# Patient Record
Sex: MALE | Race: BLACK OR AFRICAN AMERICAN | Employment: UNEMPLOYED | URBAN - METROPOLITAN AREA
[De-identification: names, ages, dates, MRNs, and addresses within clinical notes are randomized per-mention and may not be internally consistent; named-entity substitution may affect disease eponyms.]

---

## 2017-04-18 ENCOUNTER — APPOINTMENT (OUTPATIENT)
Dept: GENERAL RADIOLOGY | Age: 67
End: 2017-04-18
Attending: EMERGENCY MEDICINE
Payer: OTHER GOVERNMENT

## 2017-04-18 ENCOUNTER — HOSPITAL ENCOUNTER (EMERGENCY)
Age: 67
Discharge: HOME | End: 2017-04-19
Attending: EMERGENCY MEDICINE
Payer: OTHER GOVERNMENT

## 2017-04-18 DIAGNOSIS — R07.89 OTHER CHEST PAIN: Primary | ICD-10-CM

## 2017-04-18 LAB
ALBUMIN SERPL BCP-MCNC: 3.9 G/DL (ref 3.5–5)
ALBUMIN/GLOB SERPL: 1.1 {RATIO} (ref 1.1–2.2)
ALP SERPL-CCNC: 85 U/L (ref 45–117)
ALT SERPL-CCNC: 19 U/L (ref 12–78)
ANION GAP BLD CALC-SCNC: 10 MMOL/L (ref 5–15)
APPEARANCE UR: ABNORMAL
AST SERPL W P-5'-P-CCNC: 14 U/L (ref 15–37)
BACTERIA URNS QL MICRO: NEGATIVE /HPF
BASOPHILS # BLD AUTO: 0 K/UL (ref 0–0.1)
BASOPHILS # BLD: 0 % (ref 0–1)
BILIRUB SERPL-MCNC: 0.4 MG/DL (ref 0.2–1)
BILIRUB UR QL: NEGATIVE
BUN SERPL-MCNC: 23 MG/DL (ref 6–20)
BUN/CREAT SERPL: 20 (ref 12–20)
CALCIUM SERPL-MCNC: 9 MG/DL (ref 8.5–10.1)
CHLORIDE SERPL-SCNC: 104 MMOL/L (ref 97–108)
CK SERPL-CCNC: 148 U/L (ref 39–308)
CO2 SERPL-SCNC: 25 MMOL/L (ref 21–32)
COLOR UR: ABNORMAL
CREAT SERPL-MCNC: 1.17 MG/DL (ref 0.7–1.3)
EOSINOPHIL # BLD: 0.2 K/UL (ref 0–0.4)
EOSINOPHIL NFR BLD: 2 % (ref 0–7)
EPITH CASTS URNS QL MICRO: ABNORMAL /LPF
ERYTHROCYTE [DISTWIDTH] IN BLOOD BY AUTOMATED COUNT: 13.7 % (ref 11.5–14.5)
GLOBULIN SER CALC-MCNC: 3.5 G/DL (ref 2–4)
GLUCOSE SERPL-MCNC: 128 MG/DL (ref 65–100)
GLUCOSE UR STRIP.AUTO-MCNC: NEGATIVE MG/DL
HCT VFR BLD AUTO: 36.1 % (ref 36.6–50.3)
HGB BLD-MCNC: 13 G/DL (ref 12.1–17)
HGB UR QL STRIP: NEGATIVE
HYALINE CASTS URNS QL MICRO: ABNORMAL /LPF (ref 0–5)
KETONES UR QL STRIP.AUTO: NEGATIVE MG/DL
LEUKOCYTE ESTERASE UR QL STRIP.AUTO: ABNORMAL
LYMPHOCYTES # BLD AUTO: 48 % (ref 12–49)
LYMPHOCYTES # BLD: 3.4 K/UL (ref 0.8–3.5)
MCH RBC QN AUTO: 32.3 PG (ref 26–34)
MCHC RBC AUTO-ENTMCNC: 36 G/DL (ref 30–36.5)
MCV RBC AUTO: 89.6 FL (ref 80–99)
MONOCYTES # BLD: 0.5 K/UL (ref 0–1)
MONOCYTES NFR BLD AUTO: 7 % (ref 5–13)
NEUTS SEG # BLD: 3.1 K/UL (ref 1.8–8)
NEUTS SEG NFR BLD AUTO: 43 % (ref 32–75)
NITRITE UR QL STRIP.AUTO: NEGATIVE
PH UR STRIP: 5.5 [PH] (ref 5–8)
PLATELET # BLD AUTO: 266 K/UL (ref 150–400)
POTASSIUM SERPL-SCNC: 3.4 MMOL/L (ref 3.5–5.1)
PROT SERPL-MCNC: 7.4 G/DL (ref 6.4–8.2)
PROT UR STRIP-MCNC: ABNORMAL MG/DL
RBC # BLD AUTO: 4.03 M/UL (ref 4.1–5.7)
RBC #/AREA URNS HPF: ABNORMAL /HPF (ref 0–5)
SODIUM SERPL-SCNC: 139 MMOL/L (ref 136–145)
SP GR UR REFRACTOMETRY: 1.02 (ref 1–1.03)
TROPONIN I SERPL-MCNC: <0.04 NG/ML
UA: UC IF INDICATED,UAUC: ABNORMAL
UROBILINOGEN UR QL STRIP.AUTO: 1 EU/DL (ref 0.2–1)
WBC # BLD AUTO: 7.2 K/UL (ref 4.1–11.1)
WBC URNS QL MICRO: ABNORMAL /HPF (ref 0–4)

## 2017-04-18 PROCEDURE — 36415 COLL VENOUS BLD VENIPUNCTURE: CPT | Performed by: EMERGENCY MEDICINE

## 2017-04-18 PROCEDURE — 99285 EMERGENCY DEPT VISIT HI MDM: CPT

## 2017-04-18 PROCEDURE — 80053 COMPREHEN METABOLIC PANEL: CPT | Performed by: EMERGENCY MEDICINE

## 2017-04-18 PROCEDURE — 82550 ASSAY OF CK (CPK): CPT | Performed by: EMERGENCY MEDICINE

## 2017-04-18 PROCEDURE — 93005 ELECTROCARDIOGRAM TRACING: CPT

## 2017-04-18 PROCEDURE — 81001 URINALYSIS AUTO W/SCOPE: CPT | Performed by: EMERGENCY MEDICINE

## 2017-04-18 PROCEDURE — 84484 ASSAY OF TROPONIN QUANT: CPT | Performed by: EMERGENCY MEDICINE

## 2017-04-18 PROCEDURE — 85025 COMPLETE CBC W/AUTO DIFF WBC: CPT | Performed by: EMERGENCY MEDICINE

## 2017-04-18 PROCEDURE — 71010 XR CHEST PORT: CPT

## 2017-04-19 VITALS
RESPIRATION RATE: 16 BRPM | OXYGEN SATURATION: 99 % | TEMPERATURE: 98.1 F | SYSTOLIC BLOOD PRESSURE: 121 MMHG | BODY MASS INDEX: 24.25 KG/M2 | HEART RATE: 62 BPM | DIASTOLIC BLOOD PRESSURE: 74 MMHG | WEIGHT: 160 LBS | HEIGHT: 68 IN

## 2017-04-19 LAB
ATRIAL RATE: 64 BPM
CALCULATED P AXIS, ECG09: 60 DEGREES
CALCULATED R AXIS, ECG10: -86 DEGREES
CALCULATED T AXIS, ECG11: 41 DEGREES
CK SERPL-CCNC: 141 U/L (ref 39–308)
DIAGNOSIS, 93000: NORMAL
P-R INTERVAL, ECG05: 218 MS
Q-T INTERVAL, ECG07: 434 MS
QRS DURATION, ECG06: 160 MS
QTC CALCULATION (BEZET), ECG08: 447 MS
TROPONIN I SERPL-MCNC: <0.04 NG/ML
VENTRICULAR RATE, ECG03: 64 BPM

## 2017-04-19 PROCEDURE — 82550 ASSAY OF CK (CPK): CPT | Performed by: EMERGENCY MEDICINE

## 2017-04-19 PROCEDURE — 84484 ASSAY OF TROPONIN QUANT: CPT | Performed by: EMERGENCY MEDICINE

## 2017-04-19 NOTE — ED NOTES
Bedside shift change report given to oncoming RN (oncoming nurse) by Omar Ruiz (offgoing nurse). Report included the following information SBAR and ED Summary.

## 2017-04-19 NOTE — DISCHARGE INSTRUCTIONS
Chest Pain: Care Instructions  Your Care Instructions  There are many things that can cause chest pain. Some are not serious and will get better on their own in a few days. But some kinds of chest pain need more testing and treatment. Your doctor may have recommended a follow-up visit in the next 8 to 12 hours. If you are not getting better, you may need more tests or treatment. Even though your doctor has released you, you still need to watch for any problems. The doctor carefully checked you, but sometimes problems can develop later. If you have new symptoms or if your symptoms do not get better, get medical care right away. If you have worse or different chest pain or pressure that lasts more than 5 minutes or you passed out (lost consciousness), call 911 or seek other emergency help right away. A medical visit is only one step in your treatment. Even if you feel better, you still need to do what your doctor recommends, such as going to all suggested follow-up appointments and taking medicines exactly as directed. This will help you recover and help prevent future problems. How can you care for yourself at home? · Rest until you feel better. · Take your medicine exactly as prescribed. Call your doctor if you think you are having a problem with your medicine. · Do not drive after taking a prescription pain medicine. When should you call for help? Call 911 if:  · You passed out (lost consciousness). · You have severe difficulty breathing. · You have symptoms of a heart attack. These may include:  ¨ Chest pain or pressure, or a strange feeling in your chest.  ¨ Sweating. ¨ Shortness of breath. ¨ Nausea or vomiting. ¨ Pain, pressure, or a strange feeling in your back, neck, jaw, or upper belly or in one or both shoulders or arms. ¨ Lightheadedness or sudden weakness. ¨ A fast or irregular heartbeat.   After you call 911, the  may tell you to chew 1 adult-strength or 2 to 4 low-dose aspirin. Wait for an ambulance. Do not try to drive yourself. Call your doctor today if:  · You have any trouble breathing. · Your chest pain gets worse. · You are dizzy or lightheaded, or you feel like you may faint. · You are not getting better as expected. · You are having new or different chest pain. Where can you learn more? Go to http://tania-lorna.info/. Enter A120 in the search box to learn more about \"Chest Pain: Care Instructions. \"  Current as of: May 27, 2016  Content Version: 11.2  © 6147-5695 [employer]. Care instructions adapted under license by [employer] (which disclaims liability or warranty for this information). If you have questions about a medical condition or this instruction, always ask your healthcare professional. [address] any warranty or liability for your use of this information.

## 2017-04-19 NOTE — ED NOTES
Patient moved to room #12 to await cardiology to see him this AM. Patient notes pain has eased greatly

## 2017-04-19 NOTE — ED NOTES
Care assumed of patient @ this time & intro with rounding done, with report from [facility], RN_________________. Patient resting quietly on stretcher without verbal complaints.

## 2017-04-19 NOTE — PROGRESS NOTES
Patient requested and provided [employer] voucher. He did not have anyone else that could provide transportation. He states he does have access to enter his home.   Cab ETA approximately 7:15-7:30AM.    Nicole Baxter RN, BSN, [facility]  ED Case Manager  [phone]

## 2017-04-19 NOTE — ED NOTES
Dr. Martha Goncalves at bedside to provide discharge paperwork. Vital signs stable. Pt in no apparent distress at this time. Mental status at baseline. Ambulatory to waiting room with steady gate, discharge paperwork in hand. Pt was given a cab ticket and was instructed to wait in the waiting room for his cab ride.

## 2017-04-19 NOTE — ED PROVIDER NOTES
HPI Comments: Marlne Dick is a 74 y.o. male with PMHx significant for HTN, acute CVA, and old MI who presents via EMS to [facility] ED with cc of acute onset intermittent CP since 11 am this morning. Pt reports his episodes last between 2-10 minutes and he is currently experiencing an episode of CP during initial encounter. Pt does states he has a hx of CP and notes \"I had a couple confirmed MI by my doctor and didn't even know it\". Pt's last cardiac stress test and catheterization was about a year ago. Pt notes that he does have SOB on exertion at baseline. He is on daily 81 mg ASA. Pt specifically denies any N/V/D, abdominal pain. [facility] ([facility] AT [place]: Dr. Galdino Arizmendi Hx: + tobacco (1 ppd), +EtOH (occasionally), + illicit drugs. There are no other complaints, changes or physical findings at this time. The history is provided by the patient and the EMS personnel. No  was used. Past Medical History:   Diagnosis Date    Acute CVA (cerebrovascular accident) ([address]     Diverticulitis large intestine w/o perforation or abscess w/bleeding 2006    with surgery and sepsis    Hypertension     Old myocardial infarct     Tobacco use        Past Surgical History:   Procedure Laterality Date    HX GI      colectomy         Family History:   Problem Relation Age of Onset    Heart Attack Brother     Heart Disease Brother        Social History     Social History    Marital status: SINGLE     Spouse name: N/A    Number of children: N/A    Years of education: N/A     Occupational History    Not on file. Social History Main Topics    Smoking status: Current Every Day Smoker     Packs/day: 1.00    Smokeless tobacco: Never Used    Alcohol use Yes      Comment: occasional    Drug use: Yes    Sexual activity: Not on file     Other Topics Concern    Not on file     Social History Narrative         ALLERGIES: Review of patient's allergies indicates no known allergies.     Review of Systems   Constitutional: Negative for chills and fever. HENT: Negative. Negative for congestion, rhinorrhea, sneezing and sore throat. Eyes: Negative. Negative for redness and visual disturbance. Respiratory: Positive for shortness of breath (at baseline). Negative for cough and wheezing. Cardiovascular: Positive for chest pain. Negative for leg swelling. Gastrointestinal: Negative. Negative for abdominal pain, diarrhea, nausea and vomiting. Genitourinary: Negative. Negative for difficulty urinating, discharge and frequency. Musculoskeletal: Negative. Negative for arthralgias, back pain, myalgias and neck stiffness. Skin: Negative. Negative for color change and rash. Neurological: Negative. Negative for dizziness, syncope, weakness, numbness and headaches. Hematological: Negative for adenopathy. Psychiatric/Behavioral: Negative. All other systems reviewed and are negative. Vitals:    04/19/17 0001 04/19/17 0030 04/19/17 0115 04/19/17 0145   BP: 150/78 128/77 113/50 124/73   Pulse: 62 61 (!) 57 64   Resp: 16 13 13 19   Temp:       SpO2: 94% 98% 93% 97%   Weight:       Height:                Physical Exam   Constitutional: He is oriented to person, place, and time. HENT:   Head: Atraumatic. Eyes: EOM are normal.   Cardiovascular: Normal rate, regular rhythm, normal heart sounds and intact distal pulses. Exam reveals no gallop and no friction rub. No murmur heard. Pulmonary/Chest: Effort normal and breath sounds normal. No respiratory distress. He has no wheezes. He has no rales. He exhibits no tenderness. Abdominal: Soft. Bowel sounds are normal. He exhibits no distension and no mass. There is no tenderness. There is no rebound and no guarding. Well healed large vertical keloid abdominal incision   Musculoskeletal: Normal range of motion. He exhibits no edema or tenderness. Neurological: He is alert and oriented to person, place, and time.    Psychiatric: He has a normal mood and affect. Nursing note and vitals reviewed. MDM  Number of Diagnoses or Management Options  Diagnosis management comments: DDx: ACS, Acute MI need to be excluded with 2 set cardiac rule out. Amount and/or Complexity of Data Reviewed  Clinical lab tests: ordered and reviewed  Tests in the radiology section of CPT®: ordered and reviewed  Tests in the medicine section of CPT®: ordered and reviewed  Review and summarize past medical records: yes  Independent visualization of images, tracings, or specimens: yes    Patient Progress  Patient progress: stable    ED Course       Procedures    Progress Note-  3:23 AM  Pt endorses having chest pain still so will hold him for cardiology in the morning. Written by Reza Rios ED scribe as dictated by Braydon Rogel MD.    CONSULT NOTE:   6:23 AM  Braydon Rogel MD spoke with Dr. Khalif Alejandra,   Specialty: Cardiology  Discussed pt's hx, disposition, and available diagnostic and imaging results. Reviewed care plans. Consultant agrees with plans as outlined. He recommends giving the pt a stress test. Will ask pt if he wants that done here or at the [place].    Written by Reza Rios ED Scribe, as dictated by Braydon Rogel MD.    LABORATORY TESTS:  Recent Results (from the past 12 hour(s))   EKG, 12 LEAD, INITIAL    Collection Time: 04/18/17  9:46 PM   Result Value Ref Range    Ventricular Rate 64 BPM    Atrial Rate 64 BPM    P-R Interval 218 ms    QRS Duration 160 ms    Q-T Interval 434 ms    QTC Calculation (Bezet) 447 ms    Calculated P Axis 60 degrees    Calculated R Axis -86 degrees    Calculated T Axis 41 degrees    Diagnosis       Sinus rhythm with 1st degree AV block  Right bundle branch block  Left anterior fascicular block  ** Bifascicular block **  Cannot rule out Inferior infarct (masked by fascicular block?) , age   undetermined  Abnormal ECG  When compared with ECG of 02-MAR-2016 15:28,  Significant changes have occurred     CBC WITH AUTOMATED DIFF    Collection Time: 04/18/17 10:40 PM   Result Value Ref Range    WBC 7.2 4.1 - 11.1 K/uL    RBC 4.03 (L) 4.10 - 5.70 M/uL    HGB 13.0 12.1 - 17.0 g/dL    HCT 36.1 (L) 36.6 - 50.3 %    MCV 89.6 80.0 - 99.0 FL    MCH 32.3 26.0 - 34.0 PG    MCHC 36.0 30.0 - 36.5 g/dL    RDW 13.7 11.5 - 14.5 %    PLATELET 511 149 - 619 K/uL    NEUTROPHILS 43 32 - 75 %    LYMPHOCYTES 48 12 - 49 %    MONOCYTES 7 5 - 13 %    EOSINOPHILS 2 0 - 7 %    BASOPHILS 0 0 - 1 %    ABS. NEUTROPHILS 3.1 1.8 - 8.0 K/UL    ABS. LYMPHOCYTES 3.4 0.8 - 3.5 K/UL    ABS. MONOCYTES 0.5 0.0 - 1.0 K/UL    ABS. EOSINOPHILS 0.2 0.0 - 0.4 K/UL    ABS. BASOPHILS 0.0 0.0 - 0.1 K/UL   METABOLIC PANEL, COMPREHENSIVE    Collection Time: 04/18/17 10:40 PM   Result Value Ref Range    Sodium 139 136 - 145 mmol/L    Potassium 3.4 (L) 3.5 - 5.1 mmol/L    Chloride 104 97 - 108 mmol/L    CO2 25 21 - 32 mmol/L    Anion gap 10 5 - 15 mmol/L    Glucose 128 (H) 65 - 100 mg/dL    BUN 23 (H) 6 - 20 MG/DL    Creatinine 1.17 0.70 - 1.30 MG/DL    BUN/Creatinine ratio 20 12 - 20      GFR est AA >60 >60 ml/min/1.73m2    GFR est non-AA >60 >60 ml/min/1.73m2    Calcium 9.0 8.5 - 10.1 MG/DL    Bilirubin, total 0.4 0.2 - 1.0 MG/DL    ALT (SGPT) 19 12 - 78 U/L    AST (SGOT) 14 (L) 15 - 37 U/L    Alk.  phosphatase 85 45 - 117 U/L    Protein, total 7.4 6.4 - 8.2 g/dL    Albumin 3.9 3.5 - 5.0 g/dL    Globulin 3.5 2.0 - 4.0 g/dL    A-G Ratio 1.1 1.1 - 2.2     URINALYSIS W/ REFLEX CULTURE    Collection Time: 04/18/17 10:40 PM   Result Value Ref Range    Color YELLOW/STRAW      Appearance CLOUDY (A) CLEAR      Specific gravity 1.022 1.003 - 1.030      pH (UA) 5.5 5.0 - 8.0      Protein TRACE (A) NEG mg/dL    Glucose NEGATIVE  NEG mg/dL    Ketone NEGATIVE  NEG mg/dL    Bilirubin NEGATIVE  NEG      Blood NEGATIVE  NEG      Urobilinogen 1.0 0.2 - 1.0 EU/dL    Nitrites NEGATIVE  NEG      Leukocyte Esterase SMALL (A) NEG      WBC 0-4 0 - 4 /hpf    RBC 0-5 0 - 5 /hpf    Epithelial cells FEW FEW /lpf    Bacteria NEGATIVE  NEG /hpf    UA:UC IF INDICATED CULTURE NOT INDICATED BY UA RESULT CNI      Hyaline cast 0-2 0 - 5 /lpf   TROPONIN I    Collection Time: 04/18/17 10:40 PM   Result Value Ref Range    Troponin-I, Qt. <0.04 <0.05 ng/mL   CK W/ REFLX CKMB    Collection Time: 04/18/17 10:40 PM   Result Value Ref Range     39 - 308 U/L   TROPONIN I    Collection Time: 04/19/17  1:36 AM   Result Value Ref Range    Troponin-I, Qt. <0.04 <0.05 ng/mL   CK W/ REFLX CKMB    Collection Time: 04/19/17  1:36 AM   Result Value Ref Range     39 - 308 U/L       IMAGING RESULTS:  XR CHEST PORT   Final Result   Study Result      INDICATION: Chest pain since this morning. Dyspnea on exertion for months.     COMPARISON: October 7, 2014     FINDINGS: AP portable imaging of the chest performed at 10:15 PM demonstrates a  stable cardiomediastinal silhouette. The lungs are clear bilaterally. No  significant osseous abnormalities are seen.     IMPRESSION  IMPRESSION: No evidence of acute cardiopulmonary process. MEDICATIONS GIVEN:  Medications - No data to display    IMPRESSION:  1. Other chest pain        PLAN:  1. Current Discharge Medication List        2. Follow-up Information     Follow up With Details Comments [address] Call today Dr Yisel Garcia and make a follow-up appointment for a stress test [address] [place] [address] [phone]    3 [facility] EMERGENCY DEPT  or [place] , If symptoms worsen [address]  [address] [facility]  [phone]        Return to ED if worse     DISCHARGE NOTE  1:56 AM  The patient has been re-evaluated and is ready for discharge. Reviewed available results with patient. Counseled pt on diagnosis and care plan. Pt has expressed understanding, and all questions have been answered. Pt agrees with plan and agrees to F/U as recommended, or return to the ED if their sxs worsen.  Discharge instructions have been provided and explained to the pt, along with reasons to return to the ED. This note is prepared by Saroj Shields acting as Scribe for MD Elian Thompson MD : The scribe's documentation has been prepared under my direction and personally reviewed by me in its entirety. I confirm that the note above accurately reflects all work, treatment, procedures, and medical decision making performed by me.

## 2017-12-26 ENCOUNTER — APPOINTMENT (OUTPATIENT)
Dept: GENERAL RADIOLOGY | Age: 67
DRG: 871 | End: 2017-12-26
Attending: EMERGENCY MEDICINE
Payer: OTHER GOVERNMENT

## 2017-12-26 ENCOUNTER — HOSPITAL ENCOUNTER (INPATIENT)
Age: 67
LOS: 3 days | Discharge: HOME HEALTH | DRG: 871 | End: 2017-12-29
Attending: EMERGENCY MEDICINE | Admitting: INTERNAL MEDICINE
Payer: OTHER GOVERNMENT

## 2017-12-26 ENCOUNTER — APPOINTMENT (OUTPATIENT)
Dept: CT IMAGING | Age: 67
DRG: 871 | End: 2017-12-26
Attending: INTERNAL MEDICINE
Payer: OTHER GOVERNMENT

## 2017-12-26 DIAGNOSIS — R09.02 HYPOXIA: ICD-10-CM

## 2017-12-26 DIAGNOSIS — J18.9 COMMUNITY ACQUIRED PNEUMONIA OF LEFT LOWER LOBE OF LUNG: Primary | ICD-10-CM

## 2017-12-26 PROBLEM — J96.01 ACUTE RESPIRATORY FAILURE WITH HYPOXIA (HCC): Status: ACTIVE | Noted: 2017-12-26

## 2017-12-26 LAB
ALBUMIN SERPL-MCNC: 2.4 G/DL (ref 3.5–5)
ALBUMIN/GLOB SERPL: 0.5 {RATIO} (ref 1.1–2.2)
ALP SERPL-CCNC: 156 U/L (ref 45–117)
ALT SERPL-CCNC: 238 U/L (ref 12–78)
ANION GAP SERPL CALC-SCNC: 9 MMOL/L (ref 5–15)
APPEARANCE UR: CLEAR
AST SERPL-CCNC: 551 U/L (ref 15–37)
ATRIAL RATE: 82 BPM
BACTERIA URNS QL MICRO: NEGATIVE /HPF
BASOPHILS # BLD: 0 K/UL (ref 0–0.1)
BASOPHILS NFR BLD: 0 % (ref 0–1)
BILIRUB SERPL-MCNC: 0.7 MG/DL (ref 0.2–1)
BILIRUB UR QL CFM: NEGATIVE
BNP SERPL-MCNC: 86 PG/ML (ref 0–125)
BUN SERPL-MCNC: 24 MG/DL (ref 6–20)
BUN/CREAT SERPL: 19 (ref 12–20)
CALCIUM SERPL-MCNC: 8.8 MG/DL (ref 8.5–10.1)
CALCULATED P AXIS, ECG09: 59 DEGREES
CALCULATED R AXIS, ECG10: -78 DEGREES
CALCULATED T AXIS, ECG11: 33 DEGREES
CHLORIDE SERPL-SCNC: 98 MMOL/L (ref 97–108)
CK MB CFR SERPL CALC: 0.6 % (ref 0–2.5)
CK MB SERPL-MCNC: 1.5 NG/ML (ref 5–25)
CK SERPL-CCNC: 252 U/L (ref 39–308)
CO2 SERPL-SCNC: 28 MMOL/L (ref 21–32)
COLOR UR: ABNORMAL
CREAT SERPL-MCNC: 1.27 MG/DL (ref 0.7–1.3)
DIAGNOSIS, 93000: NORMAL
DIFFERENTIAL METHOD BLD: ABNORMAL
EOSINOPHIL # BLD: 0 K/UL (ref 0–0.4)
EOSINOPHIL NFR BLD: 0 % (ref 0–7)
EPITH CASTS URNS QL MICRO: ABNORMAL /LPF
ERYTHROCYTE [DISTWIDTH] IN BLOOD BY AUTOMATED COUNT: 14.2 % (ref 11.5–14.5)
FLUAV AG NPH QL IA: NEGATIVE
FLUBV AG NOSE QL IA: NEGATIVE
GLOBULIN SER CALC-MCNC: 5 G/DL (ref 2–4)
GLUCOSE SERPL-MCNC: 101 MG/DL (ref 65–100)
GLUCOSE UR STRIP.AUTO-MCNC: NEGATIVE MG/DL
HCT VFR BLD AUTO: 29.5 % (ref 36.6–50.3)
HGB BLD-MCNC: 10.4 G/DL (ref 12.1–17)
HGB UR QL STRIP: ABNORMAL
HYALINE CASTS URNS QL MICRO: ABNORMAL /LPF (ref 0–5)
KETONES UR QL STRIP.AUTO: ABNORMAL MG/DL
LACTATE SERPL-SCNC: 1.3 MMOL/L (ref 0.4–2)
LEUKOCYTE ESTERASE UR QL STRIP.AUTO: ABNORMAL
LYMPHOCYTES # BLD: 1.8 K/UL (ref 0.8–3.5)
LYMPHOCYTES NFR BLD: 13 % (ref 12–49)
MCH RBC QN AUTO: 30.3 PG (ref 26–34)
MCHC RBC AUTO-ENTMCNC: 35.3 G/DL (ref 30–36.5)
MCV RBC AUTO: 86 FL (ref 80–99)
MONOCYTES # BLD: 1.5 K/UL (ref 0–1)
MONOCYTES NFR BLD: 11 % (ref 5–13)
MUCOUS THREADS URNS QL MICRO: ABNORMAL /LPF
NEUTS SEG # BLD: 10.3 K/UL (ref 1.8–8)
NEUTS SEG NFR BLD: 76 % (ref 32–75)
NITRITE UR QL STRIP.AUTO: NEGATIVE
P-R INTERVAL, ECG05: 206 MS
PH UR STRIP: 6 [PH] (ref 5–8)
PLATELET # BLD AUTO: 376 K/UL (ref 150–400)
POTASSIUM SERPL-SCNC: 3 MMOL/L (ref 3.5–5.1)
PROT SERPL-MCNC: 7.4 G/DL (ref 6.4–8.2)
PROT UR STRIP-MCNC: 300 MG/DL
Q-T INTERVAL, ECG07: 406 MS
QRS DURATION, ECG06: 140 MS
QTC CALCULATION (BEZET), ECG08: 474 MS
RBC # BLD AUTO: 3.43 M/UL (ref 4.1–5.7)
RBC #/AREA URNS HPF: ABNORMAL /HPF (ref 0–5)
RBC MORPH BLD: ABNORMAL
SODIUM SERPL-SCNC: 135 MMOL/L (ref 136–145)
SP GR UR REFRACTOMETRY: 1.03 (ref 1–1.03)
TROPONIN I SERPL-MCNC: <0.04 NG/ML
UA: UC IF INDICATED,UAUC: ABNORMAL
UROBILINOGEN UR QL STRIP.AUTO: 4 EU/DL (ref 0.2–1)
VENTRICULAR RATE, ECG03: 82 BPM
WBC # BLD AUTO: 13.6 K/UL (ref 4.1–11.1)
WBC MORPH BLD: ABNORMAL
WBC URNS QL MICRO: ABNORMAL /HPF (ref 0–4)

## 2017-12-26 PROCEDURE — 93005 ELECTROCARDIOGRAM TRACING: CPT

## 2017-12-26 PROCEDURE — 74011250636 HC RX REV CODE- 250/636: Performed by: EMERGENCY MEDICINE

## 2017-12-26 PROCEDURE — 96361 HYDRATE IV INFUSION ADD-ON: CPT

## 2017-12-26 PROCEDURE — 83880 ASSAY OF NATRIURETIC PEPTIDE: CPT | Performed by: INTERNAL MEDICINE

## 2017-12-26 PROCEDURE — 74011000250 HC RX REV CODE- 250: Performed by: EMERGENCY MEDICINE

## 2017-12-26 PROCEDURE — 81001 URINALYSIS AUTO W/SCOPE: CPT | Performed by: EMERGENCY MEDICINE

## 2017-12-26 PROCEDURE — 71020 XR CHEST PA LAT: CPT

## 2017-12-26 PROCEDURE — 74011636320 HC RX REV CODE- 636/320: Performed by: EMERGENCY MEDICINE

## 2017-12-26 PROCEDURE — 36415 COLL VENOUS BLD VENIPUNCTURE: CPT | Performed by: EMERGENCY MEDICINE

## 2017-12-26 PROCEDURE — 65660000000 HC RM CCU STEPDOWN

## 2017-12-26 PROCEDURE — 74011250636 HC RX REV CODE- 250/636: Performed by: INTERNAL MEDICINE

## 2017-12-26 PROCEDURE — 87040 BLOOD CULTURE FOR BACTERIA: CPT | Performed by: EMERGENCY MEDICINE

## 2017-12-26 PROCEDURE — 74177 CT ABD & PELVIS W/CONTRAST: CPT

## 2017-12-26 PROCEDURE — 85025 COMPLETE CBC W/AUTO DIFF WBC: CPT | Performed by: INTERNAL MEDICINE

## 2017-12-26 PROCEDURE — 80053 COMPREHEN METABOLIC PANEL: CPT | Performed by: INTERNAL MEDICINE

## 2017-12-26 PROCEDURE — 74011000250 HC RX REV CODE- 250: Performed by: INTERNAL MEDICINE

## 2017-12-26 PROCEDURE — 82550 ASSAY OF CK (CPK): CPT | Performed by: INTERNAL MEDICINE

## 2017-12-26 PROCEDURE — 99285 EMERGENCY DEPT VISIT HI MDM: CPT

## 2017-12-26 PROCEDURE — 96368 THER/DIAG CONCURRENT INF: CPT

## 2017-12-26 PROCEDURE — 74011250637 HC RX REV CODE- 250/637: Performed by: INTERNAL MEDICINE

## 2017-12-26 PROCEDURE — 87804 INFLUENZA ASSAY W/OPTIC: CPT | Performed by: EMERGENCY MEDICINE

## 2017-12-26 PROCEDURE — 84484 ASSAY OF TROPONIN QUANT: CPT | Performed by: INTERNAL MEDICINE

## 2017-12-26 PROCEDURE — 77030029684 HC NEB SM VOL KT MONA -A

## 2017-12-26 PROCEDURE — 74011000258 HC RX REV CODE- 258: Performed by: EMERGENCY MEDICINE

## 2017-12-26 PROCEDURE — 94640 AIRWAY INHALATION TREATMENT: CPT

## 2017-12-26 PROCEDURE — 96365 THER/PROPH/DIAG IV INF INIT: CPT

## 2017-12-26 PROCEDURE — 83605 ASSAY OF LACTIC ACID: CPT | Performed by: EMERGENCY MEDICINE

## 2017-12-26 RX ORDER — PANTOPRAZOLE SODIUM 40 MG/1
40 TABLET, DELAYED RELEASE ORAL
Status: DISCONTINUED | OUTPATIENT
Start: 2017-12-26 | End: 2017-12-29 | Stop reason: HOSPADM

## 2017-12-26 RX ORDER — ACETAMINOPHEN 325 MG/1
650 TABLET ORAL
Status: DISCONTINUED | OUTPATIENT
Start: 2017-12-26 | End: 2017-12-29 | Stop reason: HOSPADM

## 2017-12-26 RX ORDER — ONDANSETRON 2 MG/ML
4 INJECTION INTRAMUSCULAR; INTRAVENOUS
Status: DISCONTINUED | OUTPATIENT
Start: 2017-12-26 | End: 2017-12-29 | Stop reason: HOSPADM

## 2017-12-26 RX ORDER — ATORVASTATIN CALCIUM 40 MG/1
80 TABLET, FILM COATED ORAL
Status: DISCONTINUED | OUTPATIENT
Start: 2017-12-27 | End: 2017-12-29 | Stop reason: HOSPADM

## 2017-12-26 RX ORDER — IPRATROPIUM BROMIDE AND ALBUTEROL SULFATE 2.5; .5 MG/3ML; MG/3ML
3 SOLUTION RESPIRATORY (INHALATION) ONCE
Status: COMPLETED | OUTPATIENT
Start: 2017-12-26 | End: 2017-12-26

## 2017-12-26 RX ORDER — FACIAL-BODY WIPES
10 EACH TOPICAL DAILY PRN
Status: DISCONTINUED | OUTPATIENT
Start: 2017-12-26 | End: 2017-12-29 | Stop reason: HOSPADM

## 2017-12-26 RX ORDER — POTASSIUM CHLORIDE AND SODIUM CHLORIDE 900; 300 MG/100ML; MG/100ML
INJECTION, SOLUTION INTRAVENOUS CONTINUOUS
Status: DISCONTINUED | OUTPATIENT
Start: 2017-12-26 | End: 2017-12-29

## 2017-12-26 RX ORDER — GUAIFENESIN 100 MG/5ML
81 LIQUID (ML) ORAL DAILY
Status: DISCONTINUED | OUTPATIENT
Start: 2017-12-27 | End: 2017-12-26

## 2017-12-26 RX ORDER — GUAIFENESIN 100 MG/5ML
81 LIQUID (ML) ORAL DAILY
Status: DISCONTINUED | OUTPATIENT
Start: 2017-12-28 | End: 2017-12-29 | Stop reason: HOSPADM

## 2017-12-26 RX ORDER — NALOXONE HYDROCHLORIDE 0.4 MG/ML
0.4 INJECTION, SOLUTION INTRAMUSCULAR; INTRAVENOUS; SUBCUTANEOUS AS NEEDED
Status: DISCONTINUED | OUTPATIENT
Start: 2017-12-26 | End: 2017-12-29 | Stop reason: HOSPADM

## 2017-12-26 RX ORDER — SODIUM CHLORIDE 0.9 % (FLUSH) 0.9 %
5-10 SYRINGE (ML) INJECTION AS NEEDED
Status: DISCONTINUED | OUTPATIENT
Start: 2017-12-26 | End: 2017-12-29 | Stop reason: HOSPADM

## 2017-12-26 RX ORDER — SODIUM CHLORIDE 9 MG/ML
50 INJECTION, SOLUTION INTRAVENOUS
Status: COMPLETED | OUTPATIENT
Start: 2017-12-26 | End: 2017-12-26

## 2017-12-26 RX ORDER — SODIUM CHLORIDE 0.9 % (FLUSH) 0.9 %
5-10 SYRINGE (ML) INJECTION EVERY 8 HOURS
Status: DISCONTINUED | OUTPATIENT
Start: 2017-12-26 | End: 2017-12-27

## 2017-12-26 RX ORDER — IBUPROFEN 200 MG
1 TABLET ORAL
Status: DISCONTINUED | OUTPATIENT
Start: 2017-12-26 | End: 2017-12-29 | Stop reason: HOSPADM

## 2017-12-26 RX ORDER — SODIUM CHLORIDE 0.9 % (FLUSH) 0.9 %
10 SYRINGE (ML) INJECTION
Status: COMPLETED | OUTPATIENT
Start: 2017-12-26 | End: 2017-12-26

## 2017-12-26 RX ORDER — ENOXAPARIN SODIUM 100 MG/ML
40 INJECTION SUBCUTANEOUS EVERY 24 HOURS
Status: DISCONTINUED | OUTPATIENT
Start: 2017-12-27 | End: 2017-12-26

## 2017-12-26 RX ORDER — ATORVASTATIN CALCIUM 40 MG/1
80 TABLET, FILM COATED ORAL
Status: DISCONTINUED | OUTPATIENT
Start: 2017-12-26 | End: 2017-12-26

## 2017-12-26 RX ORDER — METRONIDAZOLE 500 MG/100ML
500 INJECTION, SOLUTION INTRAVENOUS EVERY 8 HOURS
Status: DISCONTINUED | OUTPATIENT
Start: 2017-12-26 | End: 2017-12-29 | Stop reason: HOSPADM

## 2017-12-26 RX ORDER — HYDRALAZINE HYDROCHLORIDE 20 MG/ML
20 INJECTION INTRAMUSCULAR; INTRAVENOUS
Status: DISCONTINUED | OUTPATIENT
Start: 2017-12-26 | End: 2017-12-29 | Stop reason: HOSPADM

## 2017-12-26 RX ADMIN — Medication 10 ML: at 19:34

## 2017-12-26 RX ADMIN — IOPAMIDOL 100 ML: 755 INJECTION, SOLUTION INTRAVENOUS at 18:47

## 2017-12-26 RX ADMIN — SODIUM CHLORIDE 50 ML/HR: 900 INJECTION, SOLUTION INTRAVENOUS at 18:47

## 2017-12-26 RX ADMIN — PANTOPRAZOLE SODIUM 40 MG: 40 TABLET, DELAYED RELEASE ORAL at 19:34

## 2017-12-26 RX ADMIN — SODIUM CHLORIDE 500 ML: 900 INJECTION, SOLUTION INTRAVENOUS at 12:10

## 2017-12-26 RX ADMIN — CEFTRIAXONE 1 G: 1 INJECTION, POWDER, FOR SOLUTION INTRAMUSCULAR; INTRAVENOUS at 14:31

## 2017-12-26 RX ADMIN — SODIUM CHLORIDE AND POTASSIUM CHLORIDE: 9; 2.98 INJECTION, SOLUTION INTRAVENOUS at 21:00

## 2017-12-26 RX ADMIN — Medication 10 ML: at 18:47

## 2017-12-26 RX ADMIN — IPRATROPIUM BROMIDE AND ALBUTEROL SULFATE 3 ML: .5; 3 SOLUTION RESPIRATORY (INHALATION) at 13:04

## 2017-12-26 RX ADMIN — METRONIDAZOLE 500 MG: 500 INJECTION, SOLUTION INTRAVENOUS at 19:34

## 2017-12-26 RX ADMIN — ACETAMINOPHEN 650 MG: 325 TABLET ORAL at 19:56

## 2017-12-26 RX ADMIN — AZITHROMYCIN MONOHYDRATE 500 MG: 500 INJECTION, POWDER, LYOPHILIZED, FOR SOLUTION INTRAVENOUS at 14:42

## 2017-12-26 NOTE — IP AVS SNAPSHOT
[address] 
[phone] Patient: Alessandro Choi MRN: [id number] DMZ:[id number] About your hospitalization You were admitted on:  December 26, 2017 You last received care in the:  hospitals 3 [facility] [place] You were discharged on:  December 29, 2017 Why you were hospitalized Your primary diagnosis was:  Not on File Your diagnoses also included:  Acute Respiratory Failure With Hypoxia (Hcc), Melena, Elevated Liver Enzymes Things You Need To Do (next 8 weeks) Monday Jan 08, 2018 Follow up with Tyree Virgen MD  
10;00am  hospital follow-up Phone:  [phone] Where:  [address] [address] Discharge Orders None A check dru indicates which time of day the medication should be taken. My Medications TAKE these medications as instructed Instructions Each Dose to Equal  
 Morning Noon Evening Bedtime  
 aspirin 81 mg chewable tablet Your last dose was: Your next dose is: Take 1 Tab by mouth daily. 81 mg  
    
   
   
   
  
 atorvastatin 80 mg tablet Commonly known as:  LIPITOR Your last dose was: Your next dose is: Take 1 Tab by mouth nightly. 80 mg  
    
   
   
   
  
 levoFLOXacin 750 mg tablet Commonly known as:  Floy Gottron Your last dose was: Your next dose is: Take 1 Tab by mouth every twenty-four (24) hours for 5 days. Next dose due on 12/30 at 4:30 pm.  
 750 mg  
    
   
   
   
  
 lisinopril 20 mg tablet Commonly known as:  Bermudez Boor Your last dose was: Your next dose is: Take 1 Tab by mouth daily. 20 mg  
    
   
   
   
  
 polyethylene glycol 17 gram packet Commonly known as:  Shalom Agree Start taking on:  12/30/2017 Your last dose was: Your next dose is: Take 1 Packet by mouth daily. Take this daily to prevent constipation. This is an over the counter product. 17 g Where to Get Your Medications Information on where to get these meds will be given to you by the nurse or doctor. ! Ask your nurse or doctor about these medications  
  levoFLOXacin 750 mg tablet  
 polyethylene glycol 17 gram packet Discharge Instructions HOSPITALIST DISCHARGE INSTRUCTIONS 
 
NAME: Jessica Brown :  1950 MRN:  [id number] Date/Time:  2017 4:29 PM 
 
ADMIT DATE: 2017 DISCHARGE DATE: 2017 DISCHARGE DIAGNOSIS: 
Sepsis from left lower Lobe Legionella Pneumonia (Urine Legionella Antigen positive) Acute respiratory failure with hypoxia, resolved Elevated LFT's from legionnaires disease, improved (abd ultrasound and CT A/P unremarkable) ? GI bleed with reported melanotic stools Upper endoscopy unrevealing Colonoscopy showed a polyp of the sigmoid colon and transverse colon (both removed) and a Posterior Anal Fissure Hypokalemia Essential HTN Hyperlipidemia History of left MCA CVA  Tobacco use MEDICATIONS: 
As per medication reconciliation  list 
· It is important that you take the medication exactly as they are prescribed. · Keep your medication in the bottles provided by the pharmacist and keep a list of the medication names, dosages, and times to be taken in your wallet. · Do not take other medications without consulting your doctor. Pain Management: Tylenol as needed for pain. Sitz baths for you anal fissure. What to do at [facility] Recommended diet:  Regular Diet Recommended activity: Activity as tolerated.   
 
 
If you experience any of the following symptoms then please call your primary care physician or return to the emergency room if you cannot get hold of your doctor: 
Fever, chills, nausea, vomiting, diarrhea, change in mentation, falling, bleeding, shortness of breath, chest pain or any other concerning symptoms. Follow Up: 
Dr. Tiffanie Baker MD  you are to call and set up an appointment to see them in 7-10 days. Information obtained by : 
I understand that if any problems occur once I am at home I am to contact my physician. I understand and acknowledge receipt of the instructions indicated above. Physician's or R.N.'s Signature                                                                  Date/Time Patient or Representative Signature                                                          Date/Time makr Announcement We are excited to announce that we are making your provider's discharge notes available to you in makr. You will see these notes when they are completed and signed by the physician that discharged you from your recent hospital stay. If you have any questions or concerns about any information you see in makr, please call the Health Information Department where you were seen or reach out to your Primary Care Provider for more information about your plan of care. Introducing [facility]! [facility] introduces makr patient portal. Now you can access parts of your medical record, email your doctor's office, and request medication refills online. 1. In your internet browser, go to [email or web address] 2. Click on the First Time User? Click Here link in the Sign In box. You will see the New Member Sign Up page. 3. Enter your makr Access Code exactly as it appears below. You will not need to use this code after youve completed the sign-up process.  If you do not sign up before the expiration date, you must request a new code. · Chegg[employer] Access Code: [email or web address] Expires: 3/27/2018  8:32 AM 
 
4. Enter the last four digits of your Social Security Number (xxxx) and Date of Birth (mm/dd/yyyy) as indicated and click Submit. You will be taken to the next sign-up page. 5. Create a Cheggin ID. This will be your Cheggin login ID and cannot be changed, so think of one that is secure and easy to remember. 6. Create a Cheggin password. You can change your password at any time. 7. Enter your Password Reset Question and Answer. This can be used at a later time if you forget your password. 8. Enter your e-mail address. You will receive e-mail notification when new information is available in [address] 9. Click Sign Up. You can now view and download portions of your medical record. 10. Click the Download Summary menu link to download a portable copy of your medical information. If you have questions, please visit the Frequently Asked Questions section of the Cheggin website. Remember, Cheggin is NOT to be used for urgent needs. For medical emergencies, dial 911. Now available from your iPhone and Android! Unresulted Labs-Please follow up with your PCP about these lab tests Order Current Status CULTURE, BLOOD, PAIRED Preliminary result Providers Seen During Your Hospitalization Provider Specialty Primary office phone Keven Irizarry MD Emergency Medicine [phone] Sonia Bryant MD Internal Medicine [phone] Immunizations Administered for This Admission Name Date Influenza Vaccine (Quad) PF 12/27/2017 Your Primary Care Physician (PCP) Primary Care Physician Office Phone Office Fax Malinanadia Sainz [phone] You are allergic to the following No active allergies Recent Documentation Height Weight BMI Smoking Status 1.727 m 70.3 kg 23.57 kg/m2 Current Every Day Smoker Emergency Contacts Name Discharge Info Relation Home Work Mobile Luzma Gonzales DISCHARGE CAREGIVER [3] Other Relative [6] [phone] Patient Belongings The following personal items are in your possession at time of discharge: 
  Dental Appliances: None  Visual Aid: Glasses, With patient      Home Medications: None   Jewelry: None  Clothing: At bedside, Footwear, Hat, Jacket/Coat, Pants, Socks, Undergarments    Other Valuables: None Please provide this summary of care documentation to your next provider. Signatures-by signing, you are acknowledging that this After Visit Summary has been reviewed with you and you have received a copy. Patient Signature:  ____________________________________________________________ Date:  ____________________________________________________________  
  
[facility] Provider Signature:  ____________________________________________________________ Date:  ____________________________________________________________

## 2017-12-26 NOTE — H&P
Hospitalist Admission Note    NAME:  Naren Gutierrez   :   1950   MRN:   [id number]     Date of admit: 2017    PCP: [facility]    Assessment/Plan:     Sepsis POA with WBC 13,600, HR 98, normal lactate  Suspect due to LLL pneumonia  With recurrent N/V/Diarrhea, ? intraabdominal process  Abnormal LFTs, ? Biliary process or acute hepatitis    LLL pneumonia ? CAP vs aspiration POA  Increased cough and WESTBROOK  Admits to choking several times while vomiting    Nausea/vomiting with diarrhea POA   Going on 7 days, etiology unclear, seems to be resolving now, no vomiting today  Mildly diffusely tender in abdomen  Prior colostomy, ? From diverticulitis  ? Colitis, ? Acute hepatitis, ? Cholecystitis, ? SBO, ? Related to pneumonia    Acute respiratory failure with hypoxia POA sats 88% room air at rest, severe WESTBROOK  Setting of increased cough, recurrent N/V, choked several times after vomiting  Suspect related to underlying pneumonia    ? GI bleed with reported melanotic stools POA  Anemia ?  Acute blood loss POA HgB 10.4  Last HgB 2017 13.0  No bleeding in ED    Abnormal LFTs POA , , alk phos 156, T bili 0.7  Denies ETOH use in 2 months  Need to rule out Rhabdo with the AST >> ALT    Hypokalemia K 3.0 POA  Suspect due to N/V, diarrhea    Essential HTN POA  Cannot remember his home BP meds, says not lisinopril, changed by PCP  Asked him to have nephew bring in meds in AM    Hyperlipidemia POA    History of left MCA CVA  POA    Tobacco use POA 1 PPD    DVT prophylaxis with lovenox    Code status Full code    Plan  Admit  o2  Check CT scan abdomen/pelvis   Stool studies with persistent diarrhea  IV ceftriaxone, azithromycin, flagyl for CAP +/- aspiration  IVF  Serial HgBs  GI consult with the black stools, PPI, serial HgBs, type and screen  Check CPK  Check RUQ US and acute hepatitis panel in AM  Continue ASA  Hold statin till sure CPK okay, schedule for tomorrow PM  Nicotine patch PRN  Asked him to have nephew bring in home meds in AM  Counseled importance of quitting  Serial labs    History     CHIEF COMPLAINT: Tired, achy, throwing up and having diarrhea x 7 days, very SOB walking x days    HISTORY OF PRESENT ILLNESS:  69 Y. O. AAM  Normally follows at [facility] care  Past week has been feeling \"sick\"  Severe N/V over 5 times per day for 3-5 days, now improving   No blood or coffee grounds   Choked several times with coughing with each episode   Not vomited at all today  Loose to watery diarrhea, black at times   Now also improving, no blood   Denies antibiotics in the past 2 months  Mildly diffusely tender in abdomen  Cough with out sputum x days  Increasing WESTBROOK, moderate to severe, even walking to the bathroom x days  No CP or pleuritic CP   No LE edema  Mild nagging headache x 7 days  Fevers and chills at home    ED:  Hypoxic at rest to 88%  No N/V or diarrhea in ED  WBC 13.6, HR 96, normal lactate  CXR subtle ASD LLL  K 3.0  ,   IV ceftriaxone and azithro,   We were called to admit the patient      Past Medical History:   Diagnosis Date    [facility] CVA [facility]cerebrovascular accident) ([facility] [address]     Diverticulitis large intestine w/o perforation or abscess w/bleeding 2006    with surgery and sepsis    Hypertension     Old myocardial infarct     Tobacco use         Past Surgical History:   Procedure Laterality Date    HX GI      colectomy       Social History   Substance Use Topics    Smoking status: Current Every Day Smoker     Packs/day: 1.00    Smokeless tobacco: Never Used    Alcohol use Yes      Comment: occasional        Family History   Problem Relation Age of Onset    Heart Attack Brother     Heart Disease Brother         No Known Allergies     Prior to Admission medications    Medication Sig Start Date End Date Taking? Authorizing Provider   aspirin 81 mg chewable tablet Take 1 Tab by mouth daily.  6/11/14  Yes Gaurang Diego III, DO   atorvastatin (LIPITOR) 80 mg tablet Take 1 Tab by mouth nightly. 14  Yes Gaurang Diego III, DO   lisinopril (PRINIVIL, ZESTRIL) 20 mg tablet Take 1 Tab by mouth daily.  14   Emy Ahuja III, DO       Review of symptoms:     POSITIVE= Bold  Negative = not bold  General:  Subjective fever, chills, sweats, generalized weakness, weight loss, myalgias  Eyes:    blurred vision, eye pain, double vision  ENT:    Coryza, sore throat, trouble swallowing  Respiratory:   cough, sputum, WESTBROOK  Cardiology:   chest pain, orthopnea, PND, edema  Gastrointestinal:  abdominal pain , N/V, diarrhea, constipation, melena or BRBPR  Genitourinary:  Urgency, dysuria, hematuria  Muskuloskeletal :  Joint redness, swelling or acute joint pain, myalgias  Hematology:  easy bruising, nose or gum bleeding  Dermatological: rash, ulceration  Endocrine:   Polyuria or polydipsia, heat or hold intolerance  Neurological:  Headache, focal motor or sensory changes     Speech difficulties, memory loss  Psychological: depression, agitation      Objective:   VITALS:    Patient Vitals for the past 24 hrs:   Temp Pulse Resp BP SpO2   17 1530 - - - - 95 %   17 1529 - - - - 96 %   17 1454 - - - - 95 %   17 1441 - - - - 93 %   17 1409 - - - - 94 %   17 1338 - - - - 92 %   17 1320 - - - - 93 %   17 1215 - - - 147/75 92 %   17 1200 - - - 171/90 (!) 88 %   17 1145 - - - 124/76 92 %   17 1135 - - - 117/79 91 %   17 1115 - - - - 92 %   17 1104 - - - 119/86 -   17 1052 98.1 °F (36.7 °C) 96 12 153/70 93 %     Temp (24hrs), Av.1 °F (36.7 °C), Min:98.1 °F (36.7 °C), Max:98.1 °F (36.7 °C)           PHYSICAL EXAM:   General:    Alert, cooperative in no distress, eating dinner when I walked in the room, no vomiting  HEENT: Normocephalic, atraumatic    PERRL, EOMI  Sclera no icterus    Nasal mucosa without masses or discharge  Hearing intact to voice    Oropharynx without erythema or exudate No thrush  Pale MM  Neck:  No meningismus, trachea midline, no carotid bruits     Thyroid not enlarged, no nodules or tenderness  Lungs:   Crackles at bases L > R bilaterally. No wheezing    No accessory muscle use or retractions. Heart:   Regular rate and rhythm,  no murmur or gallop. No LE edema  Abdomen:   Soft, diffusely tender. Not distended. Bowel sounds normal.     No masses, No Hepatosplenomegaly, No Rebound or guarding  Lymph nodes: No cervical or inguinal VERONA  Musculoskeletal:  No Joint swelling, erythema, warmth.  No Cyanosis or clubbing  Skin:      No rashes     Not Jaundiced   No nodules or thickening    Capillary refill normal  Neurologic: Alert and oriented X 3, follows commands     Cranial nerves 2 to 12 intact    Symmetric motor strength bilaterally       LAB DATA REVIEWED:    Recent Results (from the past 12 hour(s))   EKG, 12 LEAD, INITIAL    Collection Time: 12/26/17 11:53 AM   Result Value Ref Range    Ventricular Rate 82 BPM    Atrial Rate 82 BPM    P-R Interval 206 ms    QRS Duration 140 ms    Q-T Interval 406 ms    QTC Calculation (Bezet) 474 ms    Calculated P Axis 59 degrees    Calculated R Axis -78 degrees    Calculated T Axis 33 degrees    Diagnosis       Normal sinus rhythm  Left axis deviation  Right bundle branch block  Inferior infarct (cited on or before 26-DEC-2017)  Abnormal ECG  When compared with ECG of 18-APR-2017 21:46,  Left anterior fascicular block is no longer present  Questionable change in initial forces of Inferior leads     URINALYSIS W/ REFLEX CULTURE    Collection Time: 12/26/17 11:55 AM   Result Value Ref Range    Color DARK YELLOW      Appearance CLEAR CLEAR      Specific gravity 1.028 1.003 - 1.030      pH (UA) 6.0 5.0 - 8.0      Protein 300 (A) NEG mg/dL    Glucose NEGATIVE  NEG mg/dL    Ketone TRACE (A) NEG mg/dL    Blood SMALL (A) NEG      Urobilinogen 4.0 (H) 0.2 - 1.0 EU/dL    Nitrites NEGATIVE  NEG      Leukocyte Esterase TRACE (A) NEG      WBC 0-4 0 - 4 /hpf    RBC 5-10 0 - 5 /hpf    Epithelial cells FEW FEW /lpf    Bacteria NEGATIVE  NEG /hpf    UA:UC IF INDICATED CULTURE NOT INDICATED BY UA RESULT CNI      Mucus 1+ (A) NEG /lpf    Hyaline cast 5-10 0 - 5 /lpf   INFLUENZA A & B AG (RAPID TEST)    Collection Time: 12/26/17 11:55 AM   Result Value Ref Range    Influenza A Antigen NEGATIVE  NEG      Influenza B Antigen NEGATIVE  NEG     LACTIC ACID    Collection Time: 12/26/17 11:55 AM   Result Value Ref Range    Lactic acid 1.3 0.4 - 2.0 MMOL/L   BILIRUBIN, CONFIRM    Collection Time: 12/26/17 11:55 AM   Result Value Ref Range    Bilirubin UA, confirm NEGATIVE  NEG     CBC WITH AUTOMATED DIFF    Collection Time: 12/26/17  4:39 PM   Result Value Ref Range    WBC 13.6 (H) 4.1 - 11.1 K/uL    RBC 3.43 (L) 4.10 - 5.70 M/uL    HGB 10.4 (L) 12.1 - 17.0 g/dL    HCT 29.5 (L) 36.6 - 50.3 %    MCV 86.0 80.0 - 99.0 FL    MCH 30.3 26.0 - 34.0 PG    MCHC 35.3 30.0 - 36.5 g/dL    RDW 14.2 11.5 - 14.5 %    PLATELET 933 003 - 576 K/uL    NEUTROPHILS PENDING %    LYMPHOCYTES PENDING %    MONOCYTES PENDING %    EOSINOPHILS PENDING %    BASOPHILS PENDING %    ABS. NEUTROPHILS PENDING K/UL    ABS. LYMPHOCYTES PENDING K/UL    ABS. MONOCYTES PENDING K/UL    ABS. EOSINOPHILS PENDING K/UL    ABS. BASOPHILS PENDING K/UL    DF PENDING        EKG as read by me shows NSR rate 82, LAD, right BBB, no ischemic ST-T changes    CXR read by radiology and reviewed by myself shows Frontal and lateral views of the chest obtained, comparison April 18. There is a  left lower lobe infiltrate. impression: Left lower lobe infiltrate. Inpatient is warranted for this patient because they presents with  Increased, cough, SOB  I have a high level of concern for sepsis, acute hypoxic respiratory failure, LLL CAP  I anticipate the stay in the hospital will span at least 2 midnights.     My assessment of the clinical condition and my plan of care is as outlined above    I saw the patient personally, took a history and did a complete physical exam at the bedside. I performed complex decision making in coming up with a diagnostic and treatment plan for the patient. I reviewed the patient's past medical records, current laboratory and radiology results, and actual Xray films/EKG. I have also discussed this case with the involved ED physician.     Care Plan discussed with:    Patient, ED Doc    Risk of deterioration:  High    Total Time Coordinating Admission:  65   minutes    Total Critical Care Time:         Juany Dumont MD

## 2017-12-26 NOTE — ED PROVIDER NOTES
EMERGENCY DEPARTMENT HISTORY AND PHYSICAL EXAM      Date: 12/26/2017  Patient Name: Alessandro Choi    History of Presenting Illness     Chief Complaint   Patient presents with    Generalized Body Aches     x 13 days    Vomiting       History Provided By: Patient    HPI: Alessandro Choi, 69 y.o. male with PMHx significant for MI, CVA, HTN, presents ambulatory to the ED with cc of one week of gradual onset of moderately severe myalgia, with associated cough and fatigue. Patient reports no known modifying factors of his sx at this time. He also c/o some nausea, generalized malaise, and now resolved vomitnig. Pt specifically denies any measured fevers. Pt admits to rare etoh and daily tobacco use. Pt denies h/o COPD, emphysema, DM. PCP: Richar Huitron MD    There are no other complaints, changes, or physical findings at this time. Current Outpatient Prescriptions   Medication Sig Dispense Refill    aspirin 81 mg chewable tablet Take 1 Tab by mouth daily. 7 Tab 0    atorvastatin (LIPITOR) 80 mg tablet Take 1 Tab by mouth nightly. 30 Tab 12    lisinopril (PRINIVIL, ZESTRIL) 20 mg tablet Take 1 Tab by mouth daily.  27 Tab 12       Past History     Past Medical History:  Past Medical History:   Diagnosis Date    [facility]ce[facility]brovascular accident) ([facility] [address]     Diverticulitis large intestine w/o perforation or abscess w/bleeding 2006    with surgery and sepsis    Hypertension     Old myocardial infarct     Tobacco use        Past Surgical History:  Past Surgical History:   Procedure Laterality Date    HX GI      colectomy       Family History:  Family History   Problem Relation Age of Onset    Heart Attack Brother     Heart Disease Brother        Social History:  Social History   Substance Use Topics    Smoking status: Current Every Day Smoker     Packs/day: 1.00    Smokeless tobacco: Never Used    Alcohol use Yes      Comment: occasional       Allergies:  No Known Allergies      Review of Systems Review of Systems   Constitutional: Positive for fatigue. Negative for chills and fever. HENT: Negative. Negative for congestion, rhinorrhea and sinus pressure. Eyes: Negative. Negative for discharge and redness. Respiratory: Positive for cough. Negative for chest tightness and shortness of breath. Cardiovascular: Negative. Negative for chest pain. Gastrointestinal: Positive for nausea and vomiting. Negative for abdominal pain, blood in stool and diarrhea. Endocrine: Negative. Genitourinary: Negative. Negative for flank pain and hematuria. Musculoskeletal: Positive for myalgias. Negative for back pain. Skin: Negative. Negative for rash. Neurological: Negative. Negative for dizziness, seizures, weakness, numbness and headaches. Hematological: Negative. All other systems reviewed and are negative. Physical Exam   Physical Exam   Constitutional: He is oriented to person, place, and time. He appears well-developed and well-nourished. No distress. HENT:   Head: Normocephalic and atraumatic. Nose: Nose normal.   Mouth/Throat: No oropharyngeal exudate. Eyes: Conjunctivae and EOM are normal. Pupils are equal, round, and reactive to light. No scleral icterus. Neck: Normal range of motion. Neck supple. No JVD present. No thyromegaly present. Cardiovascular: Normal rate, regular rhythm, normal heart sounds, intact distal pulses and normal pulses. PMI is not displaced. Exam reveals no gallop and no friction rub. No murmur heard. Pulmonary/Chest: Effort normal. No stridor. No respiratory distress. He has decreased breath sounds. He has wheezes. He has rhonchi. He has no rales. He exhibits no tenderness. Abdominal: Soft. Normal aorta and bowel sounds are normal. He exhibits no distension, no abdominal bruit and no mass. There is no hepatosplenomegaly. There is no tenderness. There is no rebound, no guarding and no CVA tenderness. No hernia.    Neurological: He is alert and oriented to person, place, and time. He has normal reflexes. He displays no atrophy and no tremor. No cranial nerve deficit or sensory deficit. He exhibits normal muscle tone. He displays no seizure activity. Coordination normal. GCS eye subscore is 4. GCS verbal subscore is 5. GCS motor subscore is 6. Reflex Scores:       Patellar reflexes are 2+ on the right side and 2+ on the left side. Skin: Skin is warm. No rash noted. He is not diaphoretic. No erythema. Nursing note and vitals reviewed.         Diagnostic Study Results     Labs -     Recent Results (from the past 12 hour(s))   EKG, 12 LEAD, INITIAL    Collection Time: 12/26/17 11:53 AM   Result Value Ref Range    Ventricular Rate 82 BPM    Atrial Rate 82 BPM    P-R Interval 206 ms    QRS Duration 140 ms    Q-T Interval 406 ms    QTC Calculation (Bezet) 474 ms    Calculated P Axis 59 degrees    Calculated R Axis -78 degrees    Calculated T Axis 33 degrees    Diagnosis       Normal sinus rhythm  Left axis deviation  Right bundle branch block  Inferior infarct (cited on or before 26-DEC-2017)  Abnormal ECG  When compared with ECG of 18-APR-2017 21:46,  Left anterior fascicular block is no longer present  Questionable change in initial forces of Inferior leads     URINALYSIS W/ REFLEX CULTURE    Collection Time: 12/26/17 11:55 AM   Result Value Ref Range    Color DARK YELLOW      Appearance CLEAR CLEAR      Specific gravity 1.028 1.003 - 1.030      pH (UA) 6.0 5.0 - 8.0      Protein 300 (A) NEG mg/dL    Glucose NEGATIVE  NEG mg/dL    Ketone TRACE (A) NEG mg/dL    Blood SMALL (A) NEG      Urobilinogen 4.0 (H) 0.2 - 1.0 EU/dL    Nitrites NEGATIVE  NEG      Leukocyte Esterase TRACE (A) NEG      WBC 0-4 0 - 4 /hpf    RBC 5-10 0 - 5 /hpf    Epithelial cells FEW FEW /lpf    Bacteria NEGATIVE  NEG /hpf    UA:UC IF INDICATED CULTURE NOT INDICATED BY UA RESULT CNI      Mucus 1+ (A) NEG /lpf    Hyaline cast 5-10 0 - 5 /lpf   INFLUENZA A & B AG (RAPID TEST)    Collection Time: 12/26/17 11:55 AM   Result Value Ref Range    Influenza A Antigen NEGATIVE  NEG      Influenza B Antigen NEGATIVE  NEG     LACTIC ACID    Collection Time: 12/26/17 11:55 AM   Result Value Ref Range    Lactic acid 1.3 0.4 - 2.0 MMOL/L   BILIRUBIN, CONFIRM    Collection Time: 12/26/17 11:55 AM   Result Value Ref Range    Bilirubin UA, confirm NEGATIVE  NEG         Radiologic Studies -   XR CHEST PA LAT   Final Result        CT Results  (Last 48 hours)    None        CXR Results  (Last 48 hours)               12/26/17 1228  XR CHEST PA LAT Final result    Impression:   impression: Left lower lobe infiltrate. Narrative:  Clinical indication: Weakness. Frontal and lateral views of the chest obtained, comparison April 18. There is a   left lower lobe infiltrate. Medical Decision Making   I am the first provider for this patient. I reviewed the vital signs, available nursing notes, past medical history, past surgical history, family history and social history. Vital Signs-Reviewed the patient's vital signs. Patient Vitals for the past 12 hrs:   Temp Pulse Resp BP SpO2   12/26/17 1441 - - - - 93 %   12/26/17 1409 - - - - 94 %   12/26/17 1338 - - - - 92 %   12/26/17 1320 - - - - 93 %   12/26/17 1215 - - - 147/75 92 %   12/26/17 1200 - - - 171/90 (!) 88 %   12/26/17 1145 - - - 124/76 92 %   12/26/17 1135 - - - 117/79 91 %   12/26/17 1115 - - - - 92 %   12/26/17 1104 - - - 119/86 -   12/26/17 1052 98.1 °F (36.7 °C) 96 12 153/70 93 %       Pulse Oximetry Analysis - 93% on RA    Cardiac Monitor:   Rate: 90 bpm  Rhythm: Normal Sinus Rhythm      EKG interpretation: (Preliminary) 1153  Rhythm: normal sinus rhythm; and regular . Rate (approx.): 82; Axis: left axis deviation; NJ interval: normal; QRS interval: normal ; ST/T wave: normal; Other findings: RBBB. No change from prior ekg 04/2017.   Written by LOIS Newman, as dictated by Keven Irizarry MD.    Records Reviewed: Nursing Notes, Old Medical Records and Previous electrocardiograms    Provider Notes (Medical Decision Making):   Ddx: PNA, CHF, PE, hepatitis, pancreas, electrolyte abnml, viral syndrome   Imp/plan: h/o HTN, tobacco abuse to the ER with chronic complaints of worsening weakness and SOB. Clinically evidence of PNA and mild hypoxia. Will admit to hospitalist for IV abx and further evaluation. ED Course:   Initial assessment performed. The patients presenting problems have been discussed, and they are in agreement with the care plan formulated and outlined with them. I have encouraged them to ask questions as they arise throughout their visit. Consult Note:   2:09 PM  Keeley Gong MD spoke with Alessandra Dugan MD   Specialty: Hospitalist  Discussed pt's hx, disposition, and available diagnostic and imaging results. Reviewed care plans. Consultant will evaluate pt for admission. Written by Fazal Mitchell, ED Scribe, as dictated by Keeley Gong MD.     Critical Care Time: none    Disposition:    1. Admit to hospitalist.    Admit Note:  2:13 PM  Pt is being admitted by Dr. Alessandra Dugan, hospitalist. The results of their tests and reason(s) for their admission have been discussed with pt and/or available family. They convey agreement and understanding for the need to be admitted and for admission diagnosis. PLAN:  1. Current Discharge Medication List        2. Follow-up Information     None        Return to ED if worse     Diagnosis     Clinical Impression:   1. Community acquired pneumonia of left lower lobe of lung ([facility] [address]    2. Hypoxia        Attestations: This note is prepared by Fazal Mitchell, acting as Scribe for Keeley Gong MD.       The scribe's documentation has been prepared under my direction and personally reviewed by me in its entirety. I confirm that the note above accurately reflects all work, treatment, procedures, and medical decision making performed by me.

## 2017-12-26 NOTE — ED NOTES
TRANSFER - OUT REPORT:    Verbal report given to NESTOR Rasmussen(name) on Chance Jensen  being transferred to neuro(unit) for routine progression of care       Report consisted of patients Situation, Background, Assessment and   Recommendations(SBAR). Information from the following report(s) Kardex, ED Summary and Recent Results was reviewed with the receiving nurse. Lines:   Peripheral IV 12/26/17 Left Antecubital (Active)   Site Assessment Clean, dry, & intact 12/26/2017 11:13 AM   Phlebitis Assessment 0 12/26/2017 11:13 AM   Infiltration Assessment 0 12/26/2017 11:13 AM   Dressing Status Clean, dry, & intact 12/26/2017 11:13 AM   Hub Color/Line Status Pink 12/26/2017 11:13 AM   Action Taken Blood drawn 12/26/2017 11:13 AM        Opportunity for questions and clarification was provided.       Patient transported with:   [place]

## 2017-12-26 NOTE — ED TRIAGE NOTES
Pt arrived via wheelchair from triage to room 21 with cc of N/V/D for approx 10 days. Pt reports not being able to eat/drink anything for over a week. Pt reports initially having 5 episodes of diarrhea per day though now he has about one daily. Pt denies fever, chills, abd pain. Pt in no acute distress. VSS.

## 2017-12-26 NOTE — IP AVS SNAPSHOT
[address] [facility] 
[phone] Patient: Gerald Quintanilla MRN: [id number] CRK:[id number] My Medications TAKE these medications as instructed Instructions Each Dose to Equal  
 Morning Noon Evening Bedtime  
 aspirin 81 mg chewable tablet Your last dose was: Your next dose is: Take 1 Tab by mouth daily. 81 mg  
    
   
   
   
  
 atorvastatin 80 mg tablet Commonly known as:  LIPITOR Your last dose was: Your next dose is: Take 1 Tab by mouth nightly. 80 mg  
    
   
   
   
  
 levoFLOXacin 750 mg tablet Commonly known as:  Robyn Hi Your last dose was: Your next dose is: Take 1 Tab by mouth every twenty-four (24) hours for 5 days. Next dose due on 12/30 at 4:30 pm.  
 750 mg  
    
   
   
   
  
 lisinopril 20 mg tablet Commonly known as:  Aidee Robb Your last dose was: Your next dose is: Take 1 Tab by mouth daily. 20 mg  
    
   
   
   
  
 polyethylene glycol 17 gram packet Commonly known as:  Jennifer Garner Start taking on:  12/30/2017 Your last dose was: Your next dose is: Take 1 Packet by mouth daily. Take this daily to prevent constipation. This is an over the counter product. 17 g Where to Get Your Medications Information on where to get these meds will be given to you by the nurse or doctor. ! Ask your nurse or doctor about these medications  
  levoFLOXacin 750 mg tablet  
 polyethylene glycol 17 gram packet

## 2017-12-27 ENCOUNTER — APPOINTMENT (OUTPATIENT)
Dept: ULTRASOUND IMAGING | Age: 67
DRG: 871 | End: 2017-12-27
Attending: INTERNAL MEDICINE
Payer: OTHER GOVERNMENT

## 2017-12-27 PROBLEM — R74.8 ELEVATED LIVER ENZYMES: Status: ACTIVE | Noted: 2017-12-27

## 2017-12-27 PROBLEM — K92.1 MELENA: Status: ACTIVE | Noted: 2017-12-27

## 2017-12-27 LAB
ABO + RH BLD: NORMAL
ALBUMIN SERPL-MCNC: 2.2 G/DL (ref 3.5–5)
ALBUMIN/GLOB SERPL: 0.5 {RATIO} (ref 1.1–2.2)
ALP SERPL-CCNC: 157 U/L (ref 45–117)
ALT SERPL-CCNC: 206 U/L (ref 12–78)
ANION GAP SERPL CALC-SCNC: 9 MMOL/L (ref 5–15)
AST SERPL-CCNC: 419 U/L (ref 15–37)
BASOPHILS # BLD: 0 K/UL (ref 0–0.1)
BASOPHILS NFR BLD: 0 % (ref 0–1)
BILIRUB SERPL-MCNC: 0.7 MG/DL (ref 0.2–1)
BLOOD GROUP ANTIBODIES SERPL: NORMAL
BUN SERPL-MCNC: 18 MG/DL (ref 6–20)
BUN/CREAT SERPL: 14 (ref 12–20)
CALCIUM SERPL-MCNC: 8.5 MG/DL (ref 8.5–10.1)
CHLORIDE SERPL-SCNC: 100 MMOL/L (ref 97–108)
CK MB CFR SERPL CALC: 0.7 % (ref 0–2.5)
CK MB SERPL-MCNC: 1.6 NG/ML (ref 5–25)
CK SERPL-CCNC: 246 U/L (ref 39–308)
CO2 SERPL-SCNC: 26 MMOL/L (ref 21–32)
CREAT SERPL-MCNC: 1.26 MG/DL (ref 0.7–1.3)
DIFFERENTIAL METHOD BLD: ABNORMAL
EOSINOPHIL # BLD: 0.1 K/UL (ref 0–0.4)
EOSINOPHIL NFR BLD: 1 % (ref 0–7)
ERYTHROCYTE [DISTWIDTH] IN BLOOD BY AUTOMATED COUNT: 14.2 % (ref 11.5–14.5)
GLOBULIN SER CALC-MCNC: 4.7 G/DL (ref 2–4)
GLUCOSE SERPL-MCNC: 103 MG/DL (ref 65–100)
HAV IGM SER QL: NONREACTIVE
HBV CORE IGM SER QL: NONREACTIVE
HBV SURFACE AG SER QL: <0.1 INDEX
HBV SURFACE AG SER QL: NEGATIVE
HCT VFR BLD AUTO: 28.1 % (ref 36.6–50.3)
HCV AB SERPL QL IA: NONREACTIVE
HCV COMMENT,HCGAC: NORMAL
HGB BLD-MCNC: 10 G/DL (ref 12.1–17)
LIPASE SERPL-CCNC: 518 U/L (ref 73–393)
LYMPHOCYTES # BLD: 1.5 K/UL (ref 0.8–3.5)
LYMPHOCYTES NFR BLD: 11 % (ref 12–49)
MCH RBC QN AUTO: 31.6 PG (ref 26–34)
MCHC RBC AUTO-ENTMCNC: 35.6 G/DL (ref 30–36.5)
MCV RBC AUTO: 88.9 FL (ref 80–99)
MONOCYTES # BLD: 1.5 K/UL (ref 0–1)
MONOCYTES NFR BLD: 11 % (ref 5–13)
NEUTS SEG # BLD: 10.5 K/UL (ref 1.8–8)
NEUTS SEG NFR BLD: 77 % (ref 32–75)
PLATELET # BLD AUTO: 395 K/UL (ref 150–400)
POTASSIUM SERPL-SCNC: 3.1 MMOL/L (ref 3.5–5.1)
PROT SERPL-MCNC: 6.9 G/DL (ref 6.4–8.2)
RBC # BLD AUTO: 3.16 M/UL (ref 4.1–5.7)
RBC MORPH BLD: ABNORMAL
SODIUM SERPL-SCNC: 135 MMOL/L (ref 136–145)
SP1: NORMAL
SP2: NORMAL
SP3: NORMAL
SPECIMEN EXP DATE BLD: NORMAL
TROPONIN I SERPL-MCNC: <0.04 NG/ML
WBC # BLD AUTO: 13.6 K/UL (ref 4.1–11.1)
WBC MORPH BLD: ABNORMAL

## 2017-12-27 PROCEDURE — 90686 IIV4 VACC NO PRSV 0.5 ML IM: CPT | Performed by: INTERNAL MEDICINE

## 2017-12-27 PROCEDURE — 84484 ASSAY OF TROPONIN QUANT: CPT | Performed by: INTERNAL MEDICINE

## 2017-12-27 PROCEDURE — 87449 NOS EACH ORGANISM AG IA: CPT | Performed by: INTERNAL MEDICINE

## 2017-12-27 PROCEDURE — 87899 AGENT NOS ASSAY W/OPTIC: CPT | Performed by: INTERNAL MEDICINE

## 2017-12-27 PROCEDURE — 80074 ACUTE HEPATITIS PANEL: CPT | Performed by: INTERNAL MEDICINE

## 2017-12-27 PROCEDURE — G8978 MOBILITY CURRENT STATUS: HCPCS

## 2017-12-27 PROCEDURE — 74011250636 HC RX REV CODE- 250/636: Performed by: INTERNAL MEDICINE

## 2017-12-27 PROCEDURE — 86900 BLOOD TYPING SEROLOGIC ABO: CPT | Performed by: INTERNAL MEDICINE

## 2017-12-27 PROCEDURE — 97162 PT EVAL MOD COMPLEX 30 MIN: CPT

## 2017-12-27 PROCEDURE — 85025 COMPLETE CBC W/AUTO DIFF WBC: CPT | Performed by: INTERNAL MEDICINE

## 2017-12-27 PROCEDURE — 82550 ASSAY OF CK (CPK): CPT | Performed by: INTERNAL MEDICINE

## 2017-12-27 PROCEDURE — 36415 COLL VENOUS BLD VENIPUNCTURE: CPT | Performed by: INTERNAL MEDICINE

## 2017-12-27 PROCEDURE — 74011250637 HC RX REV CODE- 250/637: Performed by: INTERNAL MEDICINE

## 2017-12-27 PROCEDURE — G8979 MOBILITY GOAL STATUS: HCPCS

## 2017-12-27 PROCEDURE — 65660000000 HC RM CCU STEPDOWN

## 2017-12-27 PROCEDURE — 83690 ASSAY OF LIPASE: CPT | Performed by: INTERNAL MEDICINE

## 2017-12-27 PROCEDURE — 77010033678 HC OXYGEN DAILY

## 2017-12-27 PROCEDURE — 80053 COMPREHEN METABOLIC PANEL: CPT | Performed by: INTERNAL MEDICINE

## 2017-12-27 PROCEDURE — 74011000250 HC RX REV CODE- 250: Performed by: INTERNAL MEDICINE

## 2017-12-27 PROCEDURE — 76705 ECHO EXAM OF ABDOMEN: CPT

## 2017-12-27 PROCEDURE — 90471 IMMUNIZATION ADMIN: CPT

## 2017-12-27 RX ORDER — LEVOFLOXACIN 5 MG/ML
750 INJECTION, SOLUTION INTRAVENOUS EVERY 24 HOURS
Status: DISCONTINUED | OUTPATIENT
Start: 2017-12-27 | End: 2017-12-29 | Stop reason: HOSPADM

## 2017-12-27 RX ADMIN — LEVOFLOXACIN 750 MG: 5 INJECTION, SOLUTION INTRAVENOUS at 09:08

## 2017-12-27 RX ADMIN — SODIUM CHLORIDE AND POTASSIUM CHLORIDE: 9; 2.98 INJECTION, SOLUTION INTRAVENOUS at 16:51

## 2017-12-27 RX ADMIN — METRONIDAZOLE 500 MG: 500 INJECTION, SOLUTION INTRAVENOUS at 02:03

## 2017-12-27 RX ADMIN — PANTOPRAZOLE SODIUM 40 MG: 40 TABLET, DELAYED RELEASE ORAL at 08:05

## 2017-12-27 RX ADMIN — METRONIDAZOLE 500 MG: 500 INJECTION, SOLUTION INTRAVENOUS at 19:51

## 2017-12-27 RX ADMIN — Medication 10 ML: at 05:10

## 2017-12-27 RX ADMIN — INFLUENZA VIRUS VACCINE 0.5 ML: 15; 15; 15; 15 SUSPENSION INTRAMUSCULAR at 08:06

## 2017-12-27 RX ADMIN — PANTOPRAZOLE SODIUM 40 MG: 40 TABLET, DELAYED RELEASE ORAL at 16:51

## 2017-12-27 RX ADMIN — ATORVASTATIN CALCIUM 80 MG: 40 TABLET, FILM COATED ORAL at 21:18

## 2017-12-27 RX ADMIN — METRONIDAZOLE 500 MG: 500 INJECTION, SOLUTION INTRAVENOUS at 10:40

## 2017-12-27 NOTE — CONSULTS
Gastroenterology Consult     Referring Physician: Dr. Ayo Perla Date: 12/27/2017     Subjective:     Chief Complaint: nausea, vomiting, diarrhea, black stools    History of Present Illness: Garima Brown is a 69 y.o. male who was admitted yesterday for sepsis and pneumonia and is seen in consultation for melena. History is obtained from the patient who is a rather poor historian. We are asked to evaluate him for melena. Hgb =10.0. MCV 88.9. Hgb was 13 in April of this year. BUN was 24 yesterday, 18 today. He states he has had black stools off/on for months. He denies NSAID use but upon further questioning admits he took a 6-pack of goody powders last week for \"pain all over. \" He states that normally he doesn't take NSAIDs other than 81mg ASA daily. He has omeprazole 20mg in his bag of prescribed medications but isn't sure if he is taking it or not. States that he gets heartburn every day and night. Last week he had upper abd pain. He also had N/V that started a week ago. Estimates he had 5-6 episodes in total.  Denies hematemesis or coffee ground emesis. No vomiting in the past 24hrs. He isn't sure about weight loss but suspects he's lost some as he hasn't eaten much in the past week. He had diarrhea that started about a week ago, around the same time as the N/V. He states the stool was black but not frankly bloody. It lasted about 24hrs. He has hx of partial colon resection done at [facility] in 2005 \"for rotten intestines. \"  He has a large midline surgical scar. He doesn't recall ever having a colonoscopy. He may have had an EGD 12 years ago. It would have been done either at the [facility] or [facility] but he doesn't remember anything further. His LFT's are elevated (AST, ALT, alk phos). They were normal in April of this year. T. Bili is normal.  He drank heavily in his teens and 20's but not much alcohol since. No hx of hepatitis or known exposures.   CT and U/S were unremarkable regarding gallbladder, liver, biliary tree. CPK and CK are normal.  Acute viral hepatitis panel is pending. Past Medical History:   Diagnosis Date    Acute CVA (cerebrovascular accident) ([address]     Diverticulitis large intestine w/o perforation or abscess w/bleeding 2006    with surgery and sepsis    Hypertension     Old myocardial infarct     Tobacco use      Past Surgical History:   Procedure Laterality Date    HX GI      colectomy      Family History   Problem Relation Age of Onset    Heart Attack Brother     Heart Disease Brother      Social History   Substance Use Topics    Smoking status: Current Every Day Smoker     Packs/day: 1.00    Smokeless tobacco: Never Used    Alcohol use Yes      Comment: occasional      No Known Allergies  Current Facility-Administered Medications   Medication Dose Route Frequency    levoFLOXacin (LEVAQUIN) 750 mg in D5W IVPB  750 mg IntraVENous Q24H    sodium chloride (NS) flush 5-10 mL  5-10 mL IntraVENous PRN    acetaminophen (TYLENOL) tablet 650 mg  650 mg Oral Q6H PRN    naloxone (NARCAN) injection 0.4 mg  0.4 mg IntraVENous PRN    ondansetron (ZOFRAN) injection 4 mg  4 mg IntraVENous Q4H PRN    bisacodyl (DULCOLAX) suppository 10 mg  10 mg Rectal DAILY PRN    metroNIDAZOLE (FLAGYL) IVPB premix 500 mg  500 mg IntraVENous Q8H    hydrALAZINE (APRESOLINE) 20 mg/mL injection 20 mg  20 mg IntraVENous Q6H PRN    atorvastatin (LIPITOR) tablet 80 mg  80 mg Oral QHS    0.9% sodium chloride with KCl 40 mEq/L infusion   IntraVENous CONTINUOUS    nicotine (NICODERM CQ) 14 mg/24 hr patch 1 Patch  1 Patch TransDERmal DAILY PRN    pantoprazole (PROTONIX) tablet 40 mg  40 mg Oral ACB&D    [START ON 12/28/2017] aspirin chewable tablet 81 mg  81 mg Oral DAILY        Review of Systems:  A detailed 10 organ review of systems is obtained with pertinent positives as listed in the History of Present Illness and Past Medical History.      A detailed review of systems was performed as follows:  Constitutional:  +fevers, chills, weakness  Eyes:  No ocular sensitivity to the sun, blurred vision or double vision. ENMT:  No nose or sinus problems. Respiratory: +cough, sob  Cardiac:  No chest pain, exertional chest pain or palpitations  Gastrointestinal:  See history of the present illness  :   No pain with urination or hematuria  Musculoskeletal:  No arthritis or hot swollen joints. Endocrine:  No thyroid disease or diabetes  Psychiatric: No depression or feeling blue  Integumentary:  No skin rash or sensitivity to the sun. Neurologic:  No stroke or seizure; no numbness or tingling of the extremities. Heme-Lymphatic:  No history of anemia, no unexplained lumps or bumps      Objective:     Physical Exam:  Visit Vitals    /53    Pulse 79    Temp 99.5 °F (37.5 °C)    Resp 18    SpO2 93%        Gen: NAD  Skin:  Extremities and face reveal no rashes. HEENT: Sclerae anicteric. No abnormal pigmentation of the lips. Poor dentition. Cardiovascular: Regular rate and rhythm. No murmurs, gallops, or rubs. Respiratory:  Comfortable breathing with no accessory muscle use. Clear breath sounds with no wheezes, rales, or rhonchi. GI:  Abdomen nondistended, soft. Normal active bowel sounds. There is a deep mid line surgical scar. Mild tenderness in epigastrium without guarding or rebounding. No enlargement of the liver or spleen. No masses palpable. Rectal:  Deferred  Musculoskeletal:  No pitting edema of the lower legs. Neurological:  Gross memory appears intact. Patient is alert and oriented. Psychiatric:  Mood appears appropriate with judgement intact. Lymphatic:  No cervical or supraclavicular adenopathy.     Lab/Data Review:  Lab Results   Component Value Date/Time    WBC 13.6 12/27/2017 02:08 AM    HGB 10.0 12/27/2017 02:08 AM    HCT 28.1 12/27/2017 02:08 AM    PLATELET 810 40/39/0279 02:08 AM    MCV 88.9 12/27/2017 02:08 AM     Lab Results   Component Value Date/Time    Sodium 135 12/27/2017 02:08 AM    Potassium 3.1 12/27/2017 02:08 AM    Chloride 100 12/27/2017 02:08 AM    CO2 26 12/27/2017 02:08 AM    Anion gap 9 12/27/2017 02:08 AM    Glucose 103 12/27/2017 02:08 AM    BUN 18 12/27/2017 02:08 AM    Creatinine 1.26 12/27/2017 02:08 AM    BUN/Creatinine ratio 14 12/27/2017 02:08 AM    GFR est AA >60 12/27/2017 02:08 AM    GFR est non-AA 57 12/27/2017 02:08 AM    Calcium 8.5 12/27/2017 02:08 AM    Bilirubin, total 0.7 12/27/2017 02:08 AM    AST (SGOT) 419 12/27/2017 02:08 AM    Alk. phosphatase 157 12/27/2017 02:08 AM    Protein, total 6.9 12/27/2017 02:08 AM    Albumin 2.2 12/27/2017 02:08 AM    Globulin 4.7 12/27/2017 02:08 AM    A-G Ratio 0.5 12/27/2017 02:08 AM    ALT (SGPT) 206 12/27/2017 02:08 AM     Lab Results   Component Value Date/Time    Lipase 518 12/27/2017 02:08 AM     CT Results (most recent):    Results from [place] encounter on 12/26/17   CT ABD PELV W CONT   Narrative EXAM:  CT ABD PELV W CONT    INDICATION: recurrent n/V, abdominal pain, diarrhea    COMPARISON: Chest x-ray same day    CONTRAST:  100 mL of Isovue-370. TECHNIQUE:   Following the uneventful intravenous administration of contrast, thin axial  images were obtained through the abdomen and pelvis. Coronal and sagittal  reconstructions were generated. Oral contrast was not administered. CT dose  reduction was achieved through use of a standardized protocol tailored for this  examination and automatic exposure control for dose modulation. FINDINGS:   LUNG BASES: There is airspace disease at the left base  INCIDENTALLY IMAGED HEART AND MEDIASTINUM: Unremarkable. LIVER: No mass or biliary dilatation. GALLBLADDER: Unremarkable. SPLEEN: No mass. PANCREAS: No mass or ductal dilatation. ADRENALS: Unremarkable. KIDNEYS: No hydronephrosis. There is bilateral renal scarring worse on the left  STOMACH: Unremarkable. SMALL BOWEL: No dilatation or wall thickening.   COLON: Patient is post right colectomy  APPENDIX: Surgically absent  PERITONEUM: No ascites or pneumoperitoneum. RETROPERITONEUM: No lymphadenopathy or aortic aneurysm. There are vascular  calcifications  REPRODUCTIVE ORGANS: Prostate is mildly enlarged  URINARY BLADDER: No mass or calculus. BONES: No destructive bone lesion. ADDITIONAL COMMENTS: Small periumbilical hernia         Impression IMPRESSION:  1. Left lower lobe pneumonia recommend follow-up to ensure resolution  2. Otherwise no acute abnormality of the abdomen or pelvis        US Results (most recent):    Results from Hospital Encounter encounter on 12/26/17   US ABD LTD   Narrative ULTRASOUND OF THE RIGHT UPPER QUADRANT    INDICATION: Abnormal liver function tests    COMPARISON: None. TECHNIQUE:  Sonography of the right upper quadrant was performed. FINDINGS:    GALLBLADDER: No gallstones, wall thickening, or pericholecystic fluid. COMMON DUCT: 3 mm in diameter. No visualized calculi. Nitro Herman LIVER: Normal echogenicity. No focal hepatic mass or intrahepatic biliary  dilatation is shown. MAIN PORTAL VEIN: Patent. Appropriate hepatopetal flow. PANCREAS: The visualized portions of the pancreas are normal.   RIGHT KIDNEY: 10.3 cm in length. No hydronephrosis, shadowing calculus, or  contour-deforming renal mass. Impression IMPRESSION:   Normal right upper quadrant ultrasound. Assessment/Plan:     Active Problems:    Acute respiratory failure with hypoxia ([address] (12/26/2017)      Melena (12/27/2017)      Elevated liver enzymes (12/27/2017)         I recommend starting with an EGD to assess for the source of melena. If unremarkable, proceed with colonoscopy given hx of diarrhea and anemia. Agree with ruling out acute viral hepatitis. If hepatitis panel is negative, we will expand serologic evaluation of the liver. OMEGA Torres  12/27/17  11:25 AM        The patient was seen and examined Please see above note for details. Physical exam:  General:AAO x 3, Poor oral hygeine  HEENT:  EOMI,   Chest:  CTA,Heart: S1, S2, RRR  GI: Soft, Lower mid line scar, LUQ/Epigastric tenderness+ bowel sounds  Extremities: No edema    Data Review:  reviewed     Impression:   Melena/daark stools,  Anemia  PNA  Diarrhea  Elevated LFTs    Plan and discussion:  · Agree with above note. He will need an EGD but needs his sepsis and PNA treated. · Hgb is low but stable. His vital signs are normal with low grade fevers. · I suggest a GI lite diet today, NPO after MN and EGD tomorrow([place] or [place]). · Serial hgb,  · IV PPI BID  · Acute hepatitis panel for elevated LFTs,  · Pt seen and examined with Albino Else           Signed By: Shira Otero.  Miguel Arzate MD    12/27/2017  12:30 PM

## 2017-12-27 NOTE — PROGRESS NOTES
TRANSFER - IN REPORT:    Verbal report received from eamon RN(name) on Bambi Kistler  being received from ED(unit) for routine progression of care      Report consisted of patients Situation, Background, Assessment and   Recommendations(SBAR). Information from the following report(s) SBAR, Kardex, ED Summary, Intake/Output, MAR and Recent Results was reviewed with the receiving nurse. Opportunity for questions and clarification was provided. Assessment completed upon patients arrival to unit and care assumed. Skin assessment done by two RNs.  With Brenda BAEZ

## 2017-12-27 NOTE — PROGRESS NOTES
Pt admitted with PNA//resp. Failure. Pt lives in [place], has Medicare//[employer], usually goes to the [facility], went to [facility] in 2014, and is a full code. Will need to clarify NOK, nephew's name, and PCP. Care Management Interventions  PCP Verified by CM:  Yes  Mode of Transport at Discharge: Self  Transition of Care Consult (CM Consult): Discharge Planning  MyChart Signup: No  Discharge Durable Medical Equipment: No  Health Maintenance Reviewed: Yes  Physical Therapy Consult: Yes  Occupational Therapy Consult: No  Speech Therapy Consult: No  Current Support Network: Family Lives Nearby  Confirm Follow Up Transport: Family  Plan discussed with Pt/Family/Caregiver: Yes  Freedom of Choice Offered: Yes  Discharge Location  Discharge Placement: Home

## 2017-12-27 NOTE — PROGRESS NOTES
Pharmacy Automatic Renal Dosing Protocol - Antimicrobials    Indication for Antimicrobials: CAP    Current Regimen of Each Antimicrobial:  Levofloxacin 750 mg IV Q24H (Start Date 17; Day # 1)    Significant Cultures:   17 - Blood - NG - Prelim    Paralysis, amputations, malnutrition: None noted    Labs:  Recent Labs      17   0208  17   1639   CREA  1.26  1.27   BUN  18  24*   WBC  13.6*  13.6*     Temp (24hrs), Av.9 °F (37.2 °C), Min:97.9 °F (36.6 °C), Max:101.1 °F (38.4 °C)      Creatinine Clearance (mL/min) or Dialysis: 54 ml/min  No results found for: PCT    Impression/Plan: Dose is appropriate. Will verify     Pharmacy will follow daily and adjust medications as appropriate for renal function and/or serum levels.     Thank you,  Lisha Liz, PHARMD

## 2017-12-27 NOTE — PROGRESS NOTES
Spiritual Care Assessment/Progress Notes    Katty Santos [id number]  [id number]    1950  69 y.o.  male    Patient Telephone Number: [phone] (home)   Scientologist Affiliation: No Congregation   Language: English   Extended Emergency Contact Information  Primary Emergency Contact: [address] Phone: [phone]  Mobile Phone: [phone]  Relation: Other Relative   Patient Active Problem List    Diagnosis Date Noted    Melena 12/27/2017    Elevated liver enzymes 12/27/2017    Acute respiratory failure with hypoxia ([facility] 12/26/2017    HTN (hypertension) 06/08/2014    Acute CVA (cerebrovascular accident) ([facility] 06/08/2014    Tobacco use 06/08/2014    Alcohol consumption of one to four drinks per week 06/08/2014    Diverticulitis large intestine w/o perforation or abscess w/bleeding 06/08/2014        Date: 12/27/2017       Level of Scientologist/Spiritual Activity:  []         Involved in jo tradition/spiritual practice    []         Not involved in jo tradition/spiritual practice  [x]         Spiritually oriented    []         Claims no spiritual orientation    []         seeking spiritual identity  []         Feels alienated from Latter-day practice/tradition  []         Feels angry about Latter-day practice/tradition  []         Spirituality/Latter-day tradition is a resource for coping at this time.   []         Not able to assess due to medical condition    Services Provided Today:  []         crisis intervention    []         reading Scriptures  [x]         spiritual assessment    []         prayer  [x]         empathic listening/emotional support  []         rites and rituals (cite in comments)  []         life review     []         Latter-day support  []         theological development   []         advocacy  []         ethical dialog     []         blessing  []         bereavement support    [x]         support to family  []         anticipatory grief support   [] help with AMD  []         spiritual guidance    []         meditation      Spiritual Care Needs  []         Emotional Support  []         Spiritual/Roman Catholic Care  []         Loss/Adjustment  []         Advocacy/Referral                /Ethics  [x]         No needs expressed at               this time  []         Other: (note in               comments)  [address]  []         Follow up visits with               pt/family  []         Provide materials  []         Schedule sacraments  []         Contact Community               Clergy  [x]         Follow up as needed  []         Other: (note in               comments)   Initial Spiritual Assessment in 3251. Pt lay in bed awake, accompanied by his sister. Pt reported on events leading to hospitalization adding that he felt much better although still feeling poorly. Pt stated that  had come to visit him yesterday. Pt had a Bible laying on his tray table. Provided words of encouragement derived from pt's jo and pronounced blessing. Pt appeared tired. Concluded visit advising of availability as needed. Pt and sister expressed appreciation for visit. ZORAIDA Alanis

## 2017-12-27 NOTE — PROGRESS NOTES
Bedside and Verbal shift change report given to Isaiah Palomo (oncoming nurse) by Tiarra Rizo RN (offgoing nurse). Report included the following information Kardex, MAR and Recent Results. Zone Phone for oncoming shift:   [phone]    Shift Summary: Resting quietly    LDAs               Peripheral IV 12/26/17 Left Antecubital (Active)   Site Assessment Clean, dry, & intact 12/26/2017  8:28 PM   Phlebitis Assessment 0 12/26/2017  8:28 PM   Infiltration Assessment 0 12/26/2017  8:28 PM   Dressing Status Clean, dry, & intact 12/26/2017  8:28 PM   Dressing Type Transparent 12/26/2017  8:28 PM   Hub Color/Line Status Pink; Infusing 12/26/2017  8:28 PM   Action Taken Blood drawn 12/26/2017 11:13 AM                        Intake & Output   Date 12/26/17 0700 - 12/27/17 0659 12/27/17 0700 - 12/28/17 0659   Shift 0117-6986 6091-5466 24 Hour Total 6465-3409 5166-1240 24 Hour Total   I  N  T  A  K  E   I.V.  1135 1135         Volume (0.9% sodium chloride with KCl 40 mEq/L infusion)  1135 1135       Shift Total  1135 1135      O  U  T  P  U  T   Urine  750 750         Urine Voided  750 750         Urine Occurrence(s)  4 x 4 x       Shift Total  750 750      NET  385 385      Weight (kg)            Last Bowel Movement Last Bowel Movement Date: 12/25/17   Glucose Checks [] N/A  [] AC/HS  [] Q6  Concerns:   Nutrition Active Orders   Diet    DIET NPO       Consults []PT  []OT  []Speech  []Case Management   Cardiac Monitoring []N/A [x]Yes Expires:

## 2017-12-27 NOTE — PROGRESS NOTES
Problem: Mobility Impaired (Adult and Pediatric)  Goal: *Acute Goals and Plan of Care (Insert Text)  Physical Therapy Goals  Initiated 12/27/2017  1. Patient will move from supine to sit and sit to supine , scoot up and down and roll side to side in bed with independence within 7 day(s). 2.  Patient will transfer from bed to chair and chair to bed with independence using the least restrictive device within 7 day(s). 3.  Patient will perform sit to stand with independence within 7 day(s). 4.  Patient will ambulate with independence for 150 feet with the least restrictive device within 7 day(s). 5.  Patient will ascend/descend 4 stairs with 1 handrail(s) with modified independence within 7 day(s). physical Therapy EVALUATION  Patient: Eleil Garcia (73 y.o. male)  Date: 12/27/2017  Primary Diagnosis: Acute respiratory failure with hypoxia [facility])        Precautions:  Fall    ASSESSMENT :  Based on the objective data described below, the patient presents with impaired functional mobility secondary to impaired standing balance, generalized weakness, decreased activity tolerance, and mild gait impairments following admission for acute respiratory failure. Pt received supine in bed on RA (pt had taken off O2) and agreeable to PT evaluation. Pt cleared by nursing for mobility. SaO2 93% on RA prior to mobilizing, therefore O2 remained off for mobility. Pt performed bed mobility with supervision and transfers with SBA. He ambulated 20' in room with CGA to SBA, demonstrating mildly unsteady gait with narrowed CLAIRE, increased trunk sway, and mild path deviations. No overt LOB noted. SaO2 maintained >92% during mobility on RA. Pt with c/o dizziness and took seated rest break on EOB. BP assessed in sitting and standing and stable overall. Pt ambulated additional 10' to bedside chair, continuing to c/o mild dizziness, and was left sitting up with VSS on RA. RN aware and all needs were met.  Anticipate pt will progress well in acute PT and return home with family support and HHPT vs none pending progress in acute PT. PT will continue to follow to address functional impairments as listed above. Patient will benefit from skilled intervention to address the above impairments. Patients rehabilitation potential is considered to be Good  Factors which may influence rehabilitation potential include:   []         None noted  []         Mental ability/status  [x]         Medical condition  []         Home/family situation and support systems  []         Safety awareness  []         Pain tolerance/management  []         Other:      PLAN :  Recommendations and Planned Interventions:  [x]           Bed Mobility Training             []    Neuromuscular Re-Education  [x]           Transfer Training                   []    Orthotic/Prosthetic Training  [x]           Gait Training                         []    Modalities  [x]           Therapeutic Exercises           []    Edema Management/Control  [x]           Therapeutic Activities            [x]    Patient and Family Training/Education  []           Other (comment):    Frequency/Duration: Patient will be followed by physical therapy  4 times a week to address goals. Discharge Recommendations: Home Health vs None, To Be Determined  Further Equipment Recommendations for Discharge: TBD - likely none     SUBJECTIVE:   Patient stated I still feel a little dizzy.     OBJECTIVE DATA SUMMARY:   HISTORY:    Past Medical History:   Diagnosis Date    [facility]cerebrovascular accident) [facility] [address]     Diverticulitis large intestine w/o perforation or abscess w/bleeding 2006    with surgery and sepsis    Hypertension     Old myocardial infarct     Tobacco use      Past Surgical History:   Procedure Laterality Date    HX GI      colectomy     Prior Level of Function/Home Situation: Pt is independent for mobility and ADLs at baseline. Lives with family. Reports multiple falls. Drives.  Does not work.  Personal factors and/or comorbidities impacting plan of care: h/o CVA; numerous GLFs    Home Situation  Home Environment: Private residence  # Steps to Enter: 4  Rails to Enter: Yes  Hand Rails : Bilateral  One/Two Story Residence: One story  Living Alone: No  Support Systems: Family member(s), Friends \ neighbors  Patient Expects to be Discharged to[de-identified] Private residence  Current DME Used/Available at Home: Shower chair  Tub or Shower Type: Tub/Shower combination    EXAMINATION/PRESENTATION/DECISION MAKING:   Critical Behavior:  Neurologic State: Alert, Appropriate for age  Orientation Level: Oriented X4  Cognition: Appropriate for age attention/concentration, Appropriate safety awareness, Follows commands     Hearing: Auditory  Auditory Impairment: None  Skin:  Intact  Edema: None  Range Of Motion:  AROM: Generally decreased, functional                       Strength:    Strength: Generally decreased, functional                    Tone & Sensation:   Tone: Normal              Sensation: Intact               Coordination:  Coordination: Within functional limits  Vision:      Functional Mobility:  Bed Mobility:  Rolling: Supervision  Supine to Sit: Supervision     Scooting: Supervision  Transfers:  Sit to Stand: Stand-by asssistance  Stand to Sit: Stand-by asssistance                       Balance:   Sitting: Intact  Standing: Impaired  Standing - Static: Good  Standing - Dynamic : Fair  Ambulation/Gait Training:  Distance (ft): 30 Feet (ft)  Assistive Device: Gait belt  Ambulation - Level of Assistance: Contact guard assistance;Stand-by asssistance; Additional time     Gait Description (WDL): Exceptions to WDL  Gait Abnormalities: Decreased step clearance; Path deviations;Trunk sway increased        Base of Support: Narrowed     Speed/Cassi: Pace decreased (<100 feet/min); Shuffled  Step Length: Left shortened;Right shortened      Functional Measure:  Barthel Index:    Bathin  Bladder: 10  Bowels: 10  Groomin  Dressing: 10  Feeding: 10  Mobility: 5  Stairs: 5  Toilet Use: 5  Transfer (Bed to Chair and Back): 10  Total: 65       Barthel and G-code impairment scale:  Percentage of impairment CH  0% CI  1-19% CJ  20-39% CK  40-59% CL  60-79% CM  80-99% CN  100%   Barthel Score 0-100 100 99-80 79-60 59-40 20-39 1-19   0   Barthel Score 0-20 20 17-19 13-16 9-12 5-8 1-4 0      The Barthel ADL Index: Guidelines  1. The index should be used as a record of what a patient does, not as a record of what a patient could do. 2. The main aim is to establish degree of independence from any help, physical or verbal, however minor and for whatever reason. 3. The need for supervision renders the patient not independent. 4. A patient's performance should be established using the best available evidence. Asking the patient, friends/relatives and nurses are the usual sources, but direct observation and common sense are also important. However direct testing is not needed. 5. Usually the patient's performance over the preceding 24-48 hours is important, but occasionally longer periods will be relevant. 6. Middle categories imply that the patient supplies over 50 per cent of the effort. 7. Use of aids to be independent is allowed. Mildred Roberts., Barthel, D.W. (4744). Functional evaluation: the Barthel Index. [address] [facility] (14)2. [facility] PERRY Rush, Jb Lanza., [facility], [address] [place] [address] (). Measuring the change indisability after inpatient rehabilitation; comparison of the responsiveness of the Barthel Index and Functional [place] Measure. Journal of Neurology, Neurosurgery, and Psychiatry, 66(4), 566-080. Alexx Carrillo, N.J.A, VIOLETA Burgess.LUANA, & Cr Mayers, M.A. (2004.) Assessment of post-stroke quality of life in cost-effectiveness studies: The usefulness of the Barthel Index and the EuroQoL-5D. Quality of Life Research, 13, 80-71       G codes:   In compliance with CMSs Claims Based Outcome Reporting, the following G-code set was chosen for this patient based on their primary functional limitation being treated: The outcome measure chosen to determine the severity of the functional limitation was the Barthel Index with a score of 65/100 which was correlated with the impairment scale. ? Mobility - Walking and Moving Around:     - CURRENT STATUS: CJ - 20%-39% impaired, limited or restricted    - GOAL STATUS: CI - 1%-19% impaired, limited or restricted    - D/C STATUS:  ---------------To be determined---------------      Physical Therapy Evaluation Charge Determination   History Examination Presentation Decision-Making   MEDIUM  Complexity : 1-2 comorbidities / personal factors will impact the outcome/ POC  HIGH Complexity : 4+ Standardized tests and measures addressing body structure, function, activity limitation and / or participation in recreation  MEDIUM Complexity : Evolving with changing characteristics  Other outcome measures barthel index  MEDIUM      Based on the above components, the patient evaluation is determined to be of the following complexity level: MEDIUM    Pain:  Pain Scale 1: Numeric (0 - 10)  Pain Intensity 1: 0              Activity Tolerance:   Fair - VSS throughout on RA, however pt with c/o dizziness with standing and gait activities; SaO2 92-94% on RA during activity  Please refer to the flowsheet for vital signs taken during this treatment. After treatment:   [x]         Patient left in no apparent distress sitting up in chair  []         Patient left in no apparent distress in bed  [x]         Call bell left within reach  [x]         Nursing notified  [x]         Caregiver present  []         Bed alarm activated    COMMUNICATION/EDUCATION:   The patients plan of care was discussed with: Physical Therapist and Registered Nurse. [x]         Fall prevention education was provided and the patient/caregiver indicated understanding.   [x] Patient/family have participated as able in goal setting and plan of care. [x]         Patient/family agree to work toward stated goals and plan of care. []         Patient understands intent and goals of therapy, but is neutral about his/her participation. []         Patient is unable to participate in goal setting and plan of care.     Thank you for this referral.  Charly Wolfe, PT, DPT   Time Calculation: 20 mins

## 2017-12-28 ENCOUNTER — ANESTHESIA (OUTPATIENT)
Dept: ENDOSCOPY | Age: 67
DRG: 871 | End: 2017-12-28
Payer: OTHER GOVERNMENT

## 2017-12-28 ENCOUNTER — ANESTHESIA EVENT (OUTPATIENT)
Dept: ENDOSCOPY | Age: 67
DRG: 871 | End: 2017-12-28
Payer: OTHER GOVERNMENT

## 2017-12-28 LAB
ALBUMIN SERPL-MCNC: 2 G/DL (ref 3.5–5)
ALBUMIN/GLOB SERPL: 0.5 {RATIO} (ref 1.1–2.2)
ALP SERPL-CCNC: 122 U/L (ref 45–117)
ALT SERPL-CCNC: 122 U/L (ref 12–78)
ANION GAP SERPL CALC-SCNC: 9 MMOL/L (ref 5–15)
AST SERPL-CCNC: 151 U/L (ref 15–37)
BASOPHILS # BLD: 0 K/UL (ref 0–0.1)
BASOPHILS NFR BLD: 0 % (ref 0–1)
BILIRUB SERPL-MCNC: 0.7 MG/DL (ref 0.2–1)
BUN SERPL-MCNC: 14 MG/DL (ref 6–20)
BUN/CREAT SERPL: 14 (ref 12–20)
CALCIUM SERPL-MCNC: 8.2 MG/DL (ref 8.5–10.1)
CHLORIDE SERPL-SCNC: 104 MMOL/L (ref 97–108)
CO2 SERPL-SCNC: 22 MMOL/L (ref 21–32)
CREAT SERPL-MCNC: 0.99 MG/DL (ref 0.7–1.3)
DIFFERENTIAL METHOD BLD: ABNORMAL
EOSINOPHIL # BLD: 0.1 K/UL (ref 0–0.4)
EOSINOPHIL NFR BLD: 1 % (ref 0–7)
ERYTHROCYTE [DISTWIDTH] IN BLOOD BY AUTOMATED COUNT: 14.3 % (ref 11.5–14.5)
FLUID CULTURE, SPNG2: NORMAL
GLOBULIN SER CALC-MCNC: 4.3 G/DL (ref 2–4)
GLUCOSE SERPL-MCNC: 110 MG/DL (ref 65–100)
HCT VFR BLD AUTO: 27.8 % (ref 36.6–50.3)
HGB BLD-MCNC: 9.6 G/DL (ref 12.1–17)
LYMPHOCYTES # BLD: 1.5 K/UL (ref 0.8–3.5)
LYMPHOCYTES NFR BLD: 15 % (ref 12–49)
MCH RBC QN AUTO: 29.8 PG (ref 26–34)
MCHC RBC AUTO-ENTMCNC: 34.5 G/DL (ref 30–36.5)
MCV RBC AUTO: 86.3 FL (ref 80–99)
MONOCYTES # BLD: 0.9 K/UL (ref 0–1)
MONOCYTES NFR BLD: 9 % (ref 5–13)
NEUTS SEG # BLD: 7.8 K/UL (ref 1.8–8)
NEUTS SEG NFR BLD: 75 % (ref 32–75)
ORGANISM ID, SPNG3: NORMAL
PLATELET # BLD AUTO: 421 K/UL (ref 150–400)
PLEASE NOTE, SPNG4: NORMAL
POTASSIUM SERPL-SCNC: 3.2 MMOL/L (ref 3.5–5.1)
PROT SERPL-MCNC: 6.3 G/DL (ref 6.4–8.2)
RBC # BLD AUTO: 3.22 M/UL (ref 4.1–5.7)
RBC MORPH BLD: ABNORMAL
S PNEUM AG SPEC QL LA: NEGATIVE
SODIUM SERPL-SCNC: 135 MMOL/L (ref 136–145)
SPECIMEN SOURCE: NORMAL
SPECIMEN, SPNG1: NORMAL
WBC # BLD AUTO: 10.3 K/UL (ref 4.1–11.1)

## 2017-12-28 PROCEDURE — 74011250636 HC RX REV CODE- 250/636: Performed by: INTERNAL MEDICINE

## 2017-12-28 PROCEDURE — 74011000250 HC RX REV CODE- 250: Performed by: SPECIALIST

## 2017-12-28 PROCEDURE — 74011250637 HC RX REV CODE- 250/637: Performed by: INTERNAL MEDICINE

## 2017-12-28 PROCEDURE — 0DJ08ZZ INSPECTION OF UPPER INTESTINAL TRACT, VIA NATURAL OR ARTIFICIAL OPENING ENDOSCOPIC: ICD-10-PCS | Performed by: SPECIALIST

## 2017-12-28 PROCEDURE — 74011250636 HC RX REV CODE- 250/636

## 2017-12-28 PROCEDURE — 65660000000 HC RM CCU STEPDOWN

## 2017-12-28 PROCEDURE — 76060000031 HC ANESTHESIA FIRST 0.5 HR: Performed by: SPECIALIST

## 2017-12-28 PROCEDURE — 80053 COMPREHEN METABOLIC PANEL: CPT | Performed by: INTERNAL MEDICINE

## 2017-12-28 PROCEDURE — 97116 GAIT TRAINING THERAPY: CPT

## 2017-12-28 PROCEDURE — 36415 COLL VENOUS BLD VENIPUNCTURE: CPT | Performed by: INTERNAL MEDICINE

## 2017-12-28 PROCEDURE — 74011000250 HC RX REV CODE- 250

## 2017-12-28 PROCEDURE — 74011000250 HC RX REV CODE- 250: Performed by: INTERNAL MEDICINE

## 2017-12-28 PROCEDURE — 85025 COMPLETE CBC W/AUTO DIFF WBC: CPT | Performed by: INTERNAL MEDICINE

## 2017-12-28 PROCEDURE — 76040000019: Performed by: SPECIALIST

## 2017-12-28 RX ORDER — SODIUM CHLORIDE 0.9 % (FLUSH) 0.9 %
5-10 SYRINGE (ML) INJECTION EVERY 8 HOURS
Status: ACTIVE | OUTPATIENT
Start: 2017-12-28 | End: 2017-12-28

## 2017-12-28 RX ORDER — PROPOFOL 10 MG/ML
INJECTION, EMULSION INTRAVENOUS AS NEEDED
Status: DISCONTINUED | OUTPATIENT
Start: 2017-12-28 | End: 2017-12-28 | Stop reason: HOSPADM

## 2017-12-28 RX ORDER — SODIUM CHLORIDE 0.9 % (FLUSH) 0.9 %
5-10 SYRINGE (ML) INJECTION AS NEEDED
Status: ACTIVE | OUTPATIENT
Start: 2017-12-28 | End: 2017-12-28

## 2017-12-28 RX ORDER — MIDAZOLAM HYDROCHLORIDE 1 MG/ML
.25-5 INJECTION, SOLUTION INTRAMUSCULAR; INTRAVENOUS
Status: ACTIVE | OUTPATIENT
Start: 2017-12-28 | End: 2017-12-28

## 2017-12-28 RX ORDER — EPINEPHRINE 0.1 MG/ML
1 INJECTION INTRACARDIAC; INTRAVENOUS
Status: ACTIVE | OUTPATIENT
Start: 2017-12-28 | End: 2017-12-28

## 2017-12-28 RX ORDER — MIDAZOLAM HYDROCHLORIDE 1 MG/ML
1-2 INJECTION, SOLUTION INTRAMUSCULAR; INTRAVENOUS
Status: ACTIVE | OUTPATIENT
Start: 2017-12-28 | End: 2017-12-28

## 2017-12-28 RX ORDER — SODIUM CHLORIDE 9 MG/ML
75 INJECTION, SOLUTION INTRAVENOUS CONTINUOUS
Status: DISPENSED | OUTPATIENT
Start: 2017-12-28 | End: 2017-12-28

## 2017-12-28 RX ORDER — NALOXONE HYDROCHLORIDE 0.4 MG/ML
0.4 INJECTION, SOLUTION INTRAMUSCULAR; INTRAVENOUS; SUBCUTANEOUS
Status: ACTIVE | OUTPATIENT
Start: 2017-12-28 | End: 2017-12-28

## 2017-12-28 RX ORDER — ATROPINE SULFATE 0.1 MG/ML
0.5 INJECTION INTRAVENOUS
Status: ACTIVE | OUTPATIENT
Start: 2017-12-28 | End: 2017-12-28

## 2017-12-28 RX ORDER — LIDOCAINE HYDROCHLORIDE 20 MG/ML
INJECTION, SOLUTION EPIDURAL; INFILTRATION; INTRACAUDAL; PERINEURAL AS NEEDED
Status: DISCONTINUED | OUTPATIENT
Start: 2017-12-28 | End: 2017-12-28 | Stop reason: HOSPADM

## 2017-12-28 RX ORDER — DEXTROMETHORPHAN/PSEUDOEPHED 2.5-7.5/.8
1.2 DROPS ORAL
Status: DISCONTINUED | OUTPATIENT
Start: 2017-12-28 | End: 2017-12-28 | Stop reason: HOSPADM

## 2017-12-28 RX ORDER — FLUMAZENIL 0.1 MG/ML
0.2 INJECTION INTRAVENOUS
Status: ACTIVE | OUTPATIENT
Start: 2017-12-28 | End: 2017-12-28

## 2017-12-28 RX ORDER — POTASSIUM CHLORIDE 20 MEQ/1
40 TABLET, EXTENDED RELEASE ORAL
Status: COMPLETED | OUTPATIENT
Start: 2017-12-28 | End: 2017-12-28

## 2017-12-28 RX ADMIN — PROPOFOL 20 MG: 10 INJECTION, EMULSION INTRAVENOUS at 11:21

## 2017-12-28 RX ADMIN — ATORVASTATIN CALCIUM 80 MG: 40 TABLET, FILM COATED ORAL at 22:38

## 2017-12-28 RX ADMIN — LIDOCAINE HYDROCHLORIDE 20 MG: 20 INJECTION, SOLUTION EPIDURAL; INFILTRATION; INTRACAUDAL; PERINEURAL at 11:14

## 2017-12-28 RX ADMIN — PROPOFOL 50 MG: 10 INJECTION, EMULSION INTRAVENOUS at 11:15

## 2017-12-28 RX ADMIN — METRONIDAZOLE 500 MG: 500 INJECTION, SOLUTION INTRAVENOUS at 19:46

## 2017-12-28 RX ADMIN — METRONIDAZOLE 500 MG: 500 INJECTION, SOLUTION INTRAVENOUS at 03:37

## 2017-12-28 RX ADMIN — SODIUM CHLORIDE AND POTASSIUM CHLORIDE: 9; 2.98 INJECTION, SOLUTION INTRAVENOUS at 07:38

## 2017-12-28 RX ADMIN — POTASSIUM CHLORIDE 40 MEQ: 20 TABLET, EXTENDED RELEASE ORAL at 13:06

## 2017-12-28 RX ADMIN — PROPOFOL 50 MG: 10 INJECTION, EMULSION INTRAVENOUS at 11:19

## 2017-12-28 RX ADMIN — POLYETHYLENE GLYCOL 3350, SODIUM SULFATE ANHYDROUS, SODIUM BICARBONATE, SODIUM CHLORIDE, POTASSIUM CHLORIDE 4 L: 236; 22.74; 6.74; 5.86; 2.97 POWDER, FOR SOLUTION ORAL at 17:56

## 2017-12-28 RX ADMIN — SIMETHICONE 80 MG: 20 SUSPENSION/ DROPS ORAL at 11:19

## 2017-12-28 RX ADMIN — PANTOPRAZOLE SODIUM 40 MG: 40 TABLET, DELAYED RELEASE ORAL at 17:56

## 2017-12-28 RX ADMIN — LEVOFLOXACIN 750 MG: 5 INJECTION, SOLUTION INTRAVENOUS at 12:58

## 2017-12-28 RX ADMIN — SODIUM CHLORIDE 75 ML/HR: 900 INJECTION, SOLUTION INTRAVENOUS at 09:27

## 2017-12-28 RX ADMIN — ASPIRIN 81 MG 81 MG: 81 TABLET ORAL at 12:57

## 2017-12-28 NOTE — PROGRESS NOTES
TRANSFER - OUT REPORT:    Verbal report given to NESTOR Aguayo(name) on Tee Skelton  being transferred to [facility](unit) for routine progression of care       Report consisted of patients Situation, Background, Assessment and   Recommendations(SBAR). Information from the following report(s) SBAR, Procedure Summary, Intake/Output, MAR, Recent Results, Med Rec Status and Cardiac Rhythm NSR was reviewed with the receiving nurse. Lines:   Peripheral IV 12/26/17 Right Antecubital (Active)   Site Assessment Clean, dry, & intact 12/28/2017  9:27 AM   Phlebitis Assessment 0 12/28/2017  9:27 AM   Infiltration Assessment 0 12/28/2017  9:27 AM   Dressing Status Clean, dry, & intact 12/28/2017  9:27 AM   Dressing Type Tape;Transparent 12/28/2017  9:27 AM   Hub Color/Line Status Flushed;Pink; Infusing 12/28/2017  9:27 AM   Action Taken Blood drawn 12/26/2017 11:13 AM        Opportunity for questions and clarification was provided.

## 2017-12-28 NOTE — PROGRESS NOTES
Problem: Mobility Impaired (Adult and Pediatric)  Goal: *Acute Goals and Plan of Care (Insert Text)  Physical Therapy Goals  Initiated 12/27/2017  1. Patient will move from supine to sit and sit to supine , scoot up and down and roll side to side in bed with independence within 7 day(s). 2.  Patient will transfer from bed to chair and chair to bed with independence using the least restrictive device within 7 day(s). 3.  Patient will perform sit to stand with independence within 7 day(s). 4.  Patient will ambulate with independence for 150 feet with the least restrictive device within 7 day(s). 5.  Patient will ascend/descend 4 stairs with 1 handrail(s) with modified independence within 7 day(s). physical Therapy TREATMENT  Patient: Naren Gutierrez (73 y.o. male)  Date: 12/28/2017  Diagnosis: Acute respiratory failure with hypoxia (HCC)  melena  gi bleed <principal problem not specified>  Procedure(s) (LRB):  ESOPHAGOGASTRODUODENOSCOPY (EGD) (N/A) Day of Surgery  Precautions: Fall  Chart, physical therapy assessment, plan of care and goals were reviewed. ASSESSMENT:  Pt cleared by nurse to mobilize. Pt received in bed supine. Pt agreeable to ambulation. Pt performed all bend mobility at supervision  . Pt performed sit to stand transfer at [facility]. Pt ambulated 80ft at [facility] with HHA. Pt reported feeling dizzy throughout but wanting to continue. Pt moving well with no LOB. Pt requiring cueing to slow down. Pt able to correct. Pt will benefit from HHPT to improve strength and endurance. Progression toward goals:  [x]      Improving appropriately and progressing toward goals  []      Improving slowly and progressing toward goals  []      Not making progress toward goals and plan of care will be adjusted     PLAN:  Patient continues to benefit from skilled intervention to address the above impairments. Continue treatment per established plan of care.   Discharge Recommendations:  Home Health  Further Equipment Recommendations for Discharge:  none     SUBJECTIVE:   Patient stated I just get so dizzy at times.     OBJECTIVE DATA SUMMARY:   Critical Behavior:  Neurologic State: Alert  Orientation Level: Oriented X4  Cognition: Appropriate for age attention/concentration     Functional Mobility Training:  Bed Mobility:  Rolling: Supervision  Supine to Sit: Supervision     Scooting: Supervision         Transfers:  Sit to Stand: Stand-by asssistance  Stand to Sit: Stand-by asssistance                             Balance:  Sitting: Intact  Standing: Intact; With support  Standing - Static: Good  Standing - Dynamic : Good  Ambulation/Gait Training:  Distance (ft): 80 Feet (ft)  Assistive Device: Gait belt (HHA)  Ambulation - Level of Assistance: Contact guard assistance        Gait Abnormalities: Decreased step clearance        Base of Support: Narrowed     Speed/Cassi: Pace decreased (<100 feet/min)  Step Length: Right shortened;Left shortened                  Pain:  Pain Scale 1: Numeric (0 - 10)  Pain Intensity 1: 0              Activity Tolerance:   Pt moving well with improved activity tolerance.    After treatment:   [] Patient left in no apparent distress sitting up in chair  [x] Patient left in no apparent distress in bed  [x] Call bell left within reach  [x] Nursing notified  [] Caregiver present  [] Bed alarm activated    COMMUNICATION/COLLABORATION:   The patients plan of care was discussed with: Registered Nurse    Jordyn Mccloud   Time Calculation: 10 mins

## 2017-12-28 NOTE — ANESTHESIA POSTPROCEDURE EVALUATION
Post-Anesthesia Evaluation and Assessment    Patient: Rosette Felipe MRN: [id number]  SSN: [id number]    YOB: 1950  Age: 69 y.o. Sex: male       Cardiovascular Function/Vital Signs  Visit Vitals    /71    Pulse 73    Temp 36.4 °C (97.5 °F)    Resp 12    Ht 5' 8\" (1.727 m)    Wt 70.3 kg (155 lb)    SpO2 96%    BMI 23.57 kg/m2       Patient is status post total IV anesthesia, general anesthesia for Procedure(s):  ESOPHAGOGASTRODUODENOSCOPY (EGD). Nausea/Vomiting: None    Postoperative hydration reviewed and adequate. Pain:  Pain Scale 1: Numeric (0 - 10) (12/28/17 0916)  Pain Intensity 1: 0 (12/28/17 0916)   Managed    Neurological Status:   Neuro  Neurologic State: Alert (12/28/17 0910)  Orientation Level: Oriented X4 (12/28/17 0910)  Cognition: Appropriate for age attention/concentration (12/28/17 8258)  Speech: Clear (12/28/17 0910)   At baseline    Mental Status and Level of Consciousness: Arousable    Pulmonary Status:   O2 Device: Nasal cannula (12/28/17 1123)   Adequate oxygenation and airway patent    Complications related to anesthesia: None    Post-anesthesia assessment completed.  No concerns    Signed By: Edgar Fraga MD     December 28, 2017

## 2017-12-28 NOTE — PROGRESS NOTES
Problem: Falls - Risk of  Goal: *Absence of Falls  Document Ruba Fall Risk and appropriate interventions in the flowsheet.    Outcome: Progressing Towards Goal  Fall Risk Interventions:  Mobility Interventions: Patient to call before getting OOB, Communicate number of staff needed for ambulation/transfer         Medication Interventions: Patient to call before getting OOB, Teach patient to arise slowly         History of Falls Interventions: Room close to nurse's station, Door open when patient unattended, Utilize gait belt for transfer/ambulation

## 2017-12-28 NOTE — PROGRESS NOTES
Anesthesia reports 120 mg Propofol, 20 mg Lidocaine and 100 ml of Normal Saline were given during procedure. Received report from anesthesia staff on vital signs and status of patient.

## 2017-12-28 NOTE — PROGRESS NOTES
Hospitalist Progress Note    NAME: Rudy Anna   :  1950   MRN:  [id number]       Assessment / Plan:  Sepsis POA with WBC 13,600, HR 98, normal lactate  LLL pneumonia with Acute respiratory failure with hypoxia and with elevated LFT's (abd ultrasound and CT A/P unremarkable) must consider Legionella; can not rule out aspiration PNA with N/V PTA  -continue Flagyl, Start Levaquin  -send urine antigen for strep Pneumo as well as Leginella  -Monitor Leukocytosis and wean O2 as tolerated     Nausea/vomiting with diarrhea POA   Going on 7 days, etiology unclear, seems to be resolving now, no vomiting today  -improved, no diarrhea and stool studies unable to be collected at this time    ? GI bleed with reported melanotic stools POA  Anemia ? Acute blood loss POA HgB 10.4  Last HgB 2017 13.0  -no bleeding, monitor, Hgb stable at this time     Abnormal LFTs POA , , alk phos 156, T bili 0.7  Denies ETOH use in 2 months  -CK wnl     Hypokalemia K 3.0 POA  -continue potassium enriched IVF's     Essential HTN POA  -BP with adequate control at this time     Hyperlipidemia POA     History of left MCA CVA  POA     Tobacco use POA 1 PPD     DVT prophylaxis with lovenox    Code status Full code       Subjective:     Chief Complaint / Reason for Physician Visit: follow-up pneumonia and sepsis  Patient complains of malaise  WBC stable  Denies any issues with standing water around his home or leaky pipes, etc..    Review of Systems:  Symptom Y/N Comments  Symptom Y/N Comments   Fever/Chills y   Chest Pain n    Poor Appetite y   Edema     Cough    Abdominal Pain n    Sputum    Joint Pain     SOB/[place]    Pruritis/Rash     Nausea/vomit y Better though  Tolerating PT/OT     Diarrhea n Not since admit  Tolerating Diet     Constipation    Other       Could NOT obtain due to:      Objective:     VITALS:   Last 24hrs VS reviewed since prior progress note.  Most recent are:  Patient Vitals for the past 24 hrs: Temp Pulse Resp BP SpO2   12/27/17 1420 98.2 °F (36.8 °C) 82 18 140/75 95 %   12/27/17 1213 98.5 °F (36.9 °C) 73 18 136/64 95 %   12/27/17 0729 99.5 °F (37.5 °C) 79 18 128/53 93 %   12/27/17 0304 97.9 °F (36.6 °C) 81 17 134/76 95 %   12/27/17 0033 97.9 °F (36.6 °C) 81 17 146/75 99 %   12/27/17 0029 - - - - 91 %       Intake/Output Summary (Last 24 hours) at 12/27/17 1931  Last data filed at 12/27/17 5208   Gross per 24 hour   Intake             1135 ml   Output              750 ml   Net              385 ml        PHYSICAL EXAM:  General: WD, WN. Alert, cooperative, no acute distress  but ill appearing  EENT:  EOMI. Anicteric sclerae. MMM  Resp:  Coarse breath sounds at left base, no wheezing or rales. No accessory muscle use  CV:  Regular  rhythm,  No edema  GI:  Soft, Non distended, Non tender.  +Bowel sounds  Neurologic:  Alert and oriented X 3, normal speech,   Psych:   Good insight. Not anxious nor agitated  Skin:  No rashes. No jaundice    Reviewed most current lab test results and cultures  YES  Reviewed most current radiology test results   YES  Review and summation of old records today    NO  Reviewed patient's current orders and MAR    YES  PMH/SH reviewed - no change compared to H&P  ________________________________________________________________________  Care Plan discussed with:    Comments   Patient x    Family      RN     Care Manager     Consultant                        Multidiciplinary team rounds were held today with , nursing, pharmacist and clinical coordinator. Patient's plan of care was discussed; medications were reviewed and discharge planning was addressed.      ________________________________________________________________________  Total NON critical care TIME:  35   Minutes    Total CRITICAL CARE TIME Spent:   Minutes non procedure based      Comments   >50% of visit spent in counseling and coordination of care x Chart review, discussion with patient ________________________________________________________________________  Laine Hand MD     Procedures: see electronic medical records for all procedures/Xrays and details which were not copied into this note but were reviewed prior to creation of Plan. LABS:  I reviewed today's most current labs and imaging studies.   Pertinent labs include:  Recent Labs      12/27/17   0208  12/26/17   1639   WBC  13.6*  13.6*   HGB  10.0*  10.4*   HCT  28.1*  29.5*   PLT  395  376     Recent Labs      12/27/17 0208  12/26/17   1639   NA  135*  135*   K  3.1*  3.0*   CL  100  98   CO2  26  28   GLU  103*  101*   BUN  18  24*   CREA  1.26  1.27   CA  8.5  8.8   ALB  2.2*  2.4*   TBILI  0.7  0.7   SGOT  419*  551*   ALT  206*  238*       Signed: Laine Hand MD

## 2017-12-28 NOTE — PROGRESS NOTES
Eliel Taylors  1950  [id number]    Situation:  Verbal report received from: Cristobal Armstrong RN  Procedure: Procedure(s):  ESOPHAGOGASTRODUODENOSCOPY (EGD)    Background:    Preoperative diagnosis: melena  Postoperative diagnosis: normal    :  Dr. Volodymyr Lopez  Assistant(s): Endoscopy Technician-1: Lonita Frankel  Endoscopy RN-1: Mabel Demarco RN    Specimens: * No specimens in log *  H. Pylori  no    Assessment:  Intra-procedure medications   Anesthesia gave intra-procedure sedation and medications, see anesthesia flow sheet yes    Intravenous fluids: NS@ KVO     Vital signs stable      Abdominal assessment: round and soft      Recommendation:.   Return to floor

## 2017-12-28 NOTE — PROGRESS NOTES
Bedside and Verbal shift change report given to Efren Bermudez (oncoming nurse) by Kiya Ferrari (offgoing nurse). Report included the following information SBAR, Kardex, Intake/Output, MAR and Recent Results.

## 2017-12-28 NOTE — PROGRESS NOTES
Hospitalist Progress Note    NAME: Angella Peterson   :  1950   MRN:  [id number]       Assessment / Plan:  Sepsis POA with WBC 13,600, HR 98, normal lactate  LLL pneumonia with Acute respiratory failure with hypoxia and with elevated LFT's (abd ultrasound and CT A/P unremarkable) must consider Legionella; can not rule out aspiration PNA with N/V PTA  -continue Flagyl and Levaquin; Leukocytosis normalized today  -await urine antigen for strep Pneumo as well as Leginella  -get home O2 evaluation    Nausea/vomiting with diarrhea POA; resolved  Going on 7 days PTA, etiology unclear  -stool studies and enteric precautions canceled    ? GI bleed with reported melanotic stools POA  Anemia ? Acute blood loss POA HgB 10.4  Last HgB  13.0  -no bleeding, monitor, Hgb stable at this time; GI planning for EGD today     Abnormal LFTs POA   -improving     Hypokalemia K 3.0 POA  -continue potassium enriched IVF's since patient is NPO for EGD, replete with PO po     Essential HTN POA  -BP with adequate control at this time     Hyperlipidemia POA     History of left MCA CVA  POA     Tobacco use POA 1 PPD     PT/OT ordered    DVT prophylaxis with lovenox    Code status Full code       Subjective:     Chief Complaint / Reason for Physician Visit: follow-up pneumonia and sepsis  Patient still tired  NPO for EGD today  Has not had any diarrhea, again reports seeing dark stool at home    Review of Systems:  Symptom Y/N Comments  Symptom Y/N Comments   Fever/Chills    Chest Pain n    Poor Appetite  Ate well last pm  Edema n    Cough y   Abdominal Pain n    Sputum y   Joint Pain     SOB/WESTBROOK n   Pruritis/Rash     Nausea/vomit n   Tolerating PT/OT     Diarrhea n Not since admit  Tolerating Diet     Constipation    Other       Could NOT obtain due to:      Objective:     VITALS:   Last 24hrs VS reviewed since prior progress note.  Most recent are:  Patient Vitals for the past 24 hrs:   Temp Pulse Resp BP SpO2   17 0735 97.7 °F (36.5 °C) 68 18 150/71 99 %   12/28/17 0313 98.5 °F (36.9 °C) 75 18 138/70 97 %   12/27/17 2244 98.2 °F (36.8 °C) 74 17 132/65 93 %   12/27/17 2028 99 °F (37.2 °C) 71 18 136/70 97 %   12/27/17 1420 98.2 °F (36.8 °C) 82 18 140/75 95 %   12/27/17 1213 98.5 °F (36.9 °C) 73 18 136/64 95 %       Intake/Output Summary (Last 24 hours) at 12/28/17 0745  Last data filed at 12/28/17 0617   Gross per 24 hour   Intake             1200 ml   Output              400 ml   Net              800 ml        PHYSICAL EXAM:  General: WD, WN. Alert, cooperative, no acute distress  but ill appearing  EENT:  EOMI. Anicteric sclerae. MM dry  Resp:  Coarse breath sounds at left base, no wheezing or rales. No accessory muscle use  CV:  Regular  rhythm,  No edema  GI:  Soft, Non distended, Non tender.  +Bowel sounds  Neurologic:  Alert and oriented X 3, normal speech,   Psych:   Good insight. Not anxious nor agitated  Skin:  No rashes. No jaundice    Reviewed most current lab test results and cultures  YES  Reviewed most current radiology test results   YES  Review and summation of old records today    NO  Reviewed patient's current orders and MAR    YES  PMH/ reviewed - no change compared to H&P  ________________________________________________________________________  Care Plan discussed with:    Comments   Patient x    Family      RN     Care Manager     Consultant                        Multidiciplinary team rounds were held today with , nursing, pharmacist and clinical coordinator. Patient's plan of care was discussed; medications were reviewed and discharge planning was addressed.      ________________________________________________________________________  Total NON critical care TIME:  25   Minutes    Total CRITICAL CARE TIME Spent:   Minutes non procedure based      Comments   >50% of visit spent in counseling and coordination of care ________________________________________________________________________  Hola Levine MD     Procedures: see electronic medical records for all procedures/Xrays and details which were not copied into this note but were reviewed prior to creation of Plan. LABS:  I reviewed today's most current labs and imaging studies.   Pertinent labs include:  Recent Labs      12/28/17 0528 12/27/17 0208 12/26/17   1639   WBC  10.3  13.6*  13.6*   HGB  9.6*  10.0*  10.4*   HCT  27.8*  28.1*  29.5*   PLT  421*  395  376     Recent Labs      12/28/17 0528 12/27/17 0208 12/26/17   1639   NA  135*  135*  135*   K  3.2*  3.1*  3.0*   CL  104  100  98   CO2  22  26  28   GLU  110*  103*  101*   BUN  14  18  24*   CREA  0.99  1.26  1.27   CA  8.2*  8.5  8.8   ALB  2.0*  2.2*  2.4*   TBILI  0.7  0.7  0.7   SGOT  151*  419*  551*   ALT  122*  206*  238*       Signed: Hola Levine MD

## 2017-12-28 NOTE — PROGRESS NOTES
TRANSFER - IN REPORT:    Verbal report received from NESTOR Aguayo(name) on Chance Jensen  being received from 3251(unit) for ordered procedure      Report consisted of patients Situation, Background, Assessment and   Recommendations(SBAR). Information from the following report(s) SBAR, Intake/Output, MAR, Recent Results, Med Rec Status and Cardiac Rhythm NSR was reviewed with the receiving nurse. Opportunity for questions and clarification was provided. Assessment completed upon patients arrival to unit and care assumed.

## 2017-12-28 NOTE — ANESTHESIA PREPROCEDURE EVALUATION
Anesthetic History   No history of anesthetic complications            Review of Systems / Medical History  Patient summary reviewed, nursing notes reviewed and pertinent labs reviewed    Pulmonary          Pneumonia (left lower lobe on CXR 12-27-18) and smoker         Neuro/Psych       CVA: no residual symptoms  TIA     Cardiovascular    Hypertension: well controlled          Past MI and CAD    Exercise tolerance: <4 METS  Comments: 12-26-17 EKG:  SR, LAD, RBBB, LA hemiblock    6-9-14 ECHO: 60% EF, no AS   GI/Hepatic/Renal     GERD: poorly controlled          Comments: Melena, hgb 9.6    Hx of diverticulosis/itis with colectomy in 2006 Endo/Other  Within defined limits           Other Findings   Comments: 1-4 drinks per week         Physical Exam    Airway  Mallampati: III  TM Distance: > 6 cm  Neck ROM: normal range of motion   Mouth opening: Normal     Cardiovascular  Regular rate and rhythm,  S1 and S2 normal,  no murmur, click, rub, or gallop             Dental    Dentition: Poor dentition  Comments: Few remaining teeth, severe decay, no loose teeth.    Pulmonary  Breath sounds clear to auscultation               Abdominal  GI exam deferred       Other Findings            Anesthetic Plan    ASA: 3  Anesthesia type: total IV anesthesia and general          Induction: Intravenous  Anesthetic plan and risks discussed with: Patient

## 2017-12-28 NOTE — PROCEDURES
Esophagogastroduodenoscopy Procedure Note      Joel Lopez  1950  [id number]    Indication:  GI bleeding     Endoscopist: Adrian Page MD    Referring Provider:  Nigel Sampson MD    Sedation:  MAC anesthesia Propofol    Procedure Details:  After infomed consent was obtained for the procedure, with all risks and benefits of procedure explained the patient was taken to the endoscopy suite and placed in the left lateral decubitus position. Following sequential administration of sedation as per above, the endoscope was inserted into the mouth and advanced under direct vision to second portion of the duodenum. A careful inspection was made as the gastroscope was withdrawn, including a retroflexed view of the proximal stomach; findings and interventions are described below. Findings:     Esophagus: The esophageal mucosa in the proximal, mid and distal esophagus is normal.   The squamo-columnar junction is at 40 cm from incisors where the Z-line was noted. Stomach: The gastric mucosa appeared normal without any blood, no erosions or ulcers. The fundus was found to be normal with no lesions noted on retroflexion. Duodenum:   The bulb and post bulbar mucosa is normal in appearance to the second portion. The duodenal folds appeared normal.     Therapies:  none    Specimen: none            Complications:   None were encountered during the procedure. EBL:  None. Recommendations:   -no UGI bleeding source discovered.   Will prep for a colonoscopy tomorrow with Dr. Vinny Tabares MD  12/28/2017  11:22 AM

## 2017-12-28 NOTE — H&P
H&P Update: Tee Skelton was seen and examined. History and physical has been reviewed. The patient has been examined.  There have been no significant clinical changes since the completion of the originally dated History and Physical.    Signed By: Yen Pablo MD     December 28, 2017 10:55 AM

## 2017-12-29 ENCOUNTER — ANESTHESIA EVENT (OUTPATIENT)
Dept: ENDOSCOPY | Age: 67
DRG: 871 | End: 2017-12-29
Payer: OTHER GOVERNMENT

## 2017-12-29 ENCOUNTER — HOME HEALTH ADMISSION (OUTPATIENT)
Dept: HOME HEALTH SERVICES | Facility: HOME HEALTH | Age: 67
End: 2017-12-29

## 2017-12-29 ENCOUNTER — ANESTHESIA (OUTPATIENT)
Dept: ENDOSCOPY | Age: 67
DRG: 871 | End: 2017-12-29
Payer: OTHER GOVERNMENT

## 2017-12-29 VITALS
HEART RATE: 76 BPM | TEMPERATURE: 96.3 F | WEIGHT: 155 LBS | SYSTOLIC BLOOD PRESSURE: 176 MMHG | RESPIRATION RATE: 20 BRPM | DIASTOLIC BLOOD PRESSURE: 85 MMHG | HEIGHT: 68 IN | BODY MASS INDEX: 23.49 KG/M2 | OXYGEN SATURATION: 98 %

## 2017-12-29 LAB
ALBUMIN SERPL-MCNC: 2.1 G/DL (ref 3.5–5)
ALBUMIN/GLOB SERPL: 0.5 {RATIO} (ref 1.1–2.2)
ALP SERPL-CCNC: 108 U/L (ref 45–117)
ALT SERPL-CCNC: 96 U/L (ref 12–78)
ANION GAP SERPL CALC-SCNC: 9 MMOL/L (ref 5–15)
AST SERPL-CCNC: 89 U/L (ref 15–37)
BASOPHILS # BLD: 0 K/UL (ref 0–0.1)
BASOPHILS NFR BLD: 0 % (ref 0–1)
BILIRUB SERPL-MCNC: 0.5 MG/DL (ref 0.2–1)
BUN SERPL-MCNC: 7 MG/DL (ref 6–20)
BUN/CREAT SERPL: 8 (ref 12–20)
CALCIUM SERPL-MCNC: 8.2 MG/DL (ref 8.5–10.1)
CHLORIDE SERPL-SCNC: 106 MMOL/L (ref 97–108)
CO2 SERPL-SCNC: 21 MMOL/L (ref 21–32)
CREAT SERPL-MCNC: 0.84 MG/DL (ref 0.7–1.3)
DIFFERENTIAL METHOD BLD: ABNORMAL
EOSINOPHIL # BLD: 0.1 K/UL (ref 0–0.4)
EOSINOPHIL NFR BLD: 1 % (ref 0–7)
ERYTHROCYTE [DISTWIDTH] IN BLOOD BY AUTOMATED COUNT: 14.4 % (ref 11.5–14.5)
GLOBULIN SER CALC-MCNC: 4.3 G/DL (ref 2–4)
GLUCOSE SERPL-MCNC: 99 MG/DL (ref 65–100)
HCT VFR BLD AUTO: 26.8 % (ref 36.6–50.3)
HGB BLD-MCNC: 9.3 G/DL (ref 12.1–17)
L PNEUMO1 AG UR QL IA: POSITIVE
LYMPHOCYTES # BLD: 1.5 K/UL (ref 0.8–3.5)
LYMPHOCYTES NFR BLD: 18 % (ref 12–49)
MAGNESIUM SERPL-MCNC: 1 MG/DL (ref 1.6–2.4)
MCH RBC QN AUTO: 29.7 PG (ref 26–34)
MCHC RBC AUTO-ENTMCNC: 34.7 G/DL (ref 30–36.5)
MCV RBC AUTO: 85.6 FL (ref 80–99)
MONOCYTES # BLD: 0.8 K/UL (ref 0–1)
MONOCYTES NFR BLD: 9 % (ref 5–13)
NEUTS SEG # BLD: 6.1 K/UL (ref 1.8–8)
NEUTS SEG NFR BLD: 72 % (ref 32–75)
PLATELET # BLD AUTO: 544 K/UL (ref 150–400)
POTASSIUM SERPL-SCNC: 3.4 MMOL/L (ref 3.5–5.1)
PROT SERPL-MCNC: 6.4 G/DL (ref 6.4–8.2)
RBC # BLD AUTO: 3.13 M/UL (ref 4.1–5.7)
RBC MORPH BLD: ABNORMAL
SODIUM SERPL-SCNC: 136 MMOL/L (ref 136–145)
SPECIMEN SOURCE: NORMAL
WBC # BLD AUTO: 8.5 K/UL (ref 4.1–11.1)

## 2017-12-29 PROCEDURE — 74011250637 HC RX REV CODE- 250/637: Performed by: INTERNAL MEDICINE

## 2017-12-29 PROCEDURE — 74011000250 HC RX REV CODE- 250

## 2017-12-29 PROCEDURE — 74011250636 HC RX REV CODE- 250/636: Performed by: INTERNAL MEDICINE

## 2017-12-29 PROCEDURE — 97116 GAIT TRAINING THERAPY: CPT

## 2017-12-29 PROCEDURE — 0DBL8ZZ EXCISION OF TRANSVERSE COLON, VIA NATURAL OR ARTIFICIAL OPENING ENDOSCOPIC: ICD-10-PCS | Performed by: SPECIALIST

## 2017-12-29 PROCEDURE — 36415 COLL VENOUS BLD VENIPUNCTURE: CPT | Performed by: INTERNAL MEDICINE

## 2017-12-29 PROCEDURE — 88305 TISSUE EXAM BY PATHOLOGIST: CPT | Performed by: SPECIALIST

## 2017-12-29 PROCEDURE — 76060000032 HC ANESTHESIA 0.5 TO 1 HR: Performed by: SPECIALIST

## 2017-12-29 PROCEDURE — 76040000007: Performed by: SPECIALIST

## 2017-12-29 PROCEDURE — 74011250637 HC RX REV CODE- 250/637: Performed by: ANESTHESIOLOGY

## 2017-12-29 PROCEDURE — 85025 COMPLETE CBC W/AUTO DIFF WBC: CPT | Performed by: INTERNAL MEDICINE

## 2017-12-29 PROCEDURE — 0DBN8ZZ EXCISION OF SIGMOID COLON, VIA NATURAL OR ARTIFICIAL OPENING ENDOSCOPIC: ICD-10-PCS | Performed by: SPECIALIST

## 2017-12-29 PROCEDURE — 83735 ASSAY OF MAGNESIUM: CPT | Performed by: INTERNAL MEDICINE

## 2017-12-29 PROCEDURE — 77030013992 HC SNR POLYP ENDOSC BSC -B: Performed by: SPECIALIST

## 2017-12-29 PROCEDURE — 74011250636 HC RX REV CODE- 250/636

## 2017-12-29 PROCEDURE — 80053 COMPREHEN METABOLIC PANEL: CPT | Performed by: INTERNAL MEDICINE

## 2017-12-29 PROCEDURE — 74011000250 HC RX REV CODE- 250: Performed by: INTERNAL MEDICINE

## 2017-12-29 RX ORDER — SODIUM CHLORIDE 0.9 % (FLUSH) 0.9 %
5-10 SYRINGE (ML) INJECTION EVERY 8 HOURS
Status: COMPLETED | OUTPATIENT
Start: 2017-12-29 | End: 2017-12-29

## 2017-12-29 RX ORDER — LIDOCAINE HYDROCHLORIDE 20 MG/ML
INJECTION, SOLUTION EPIDURAL; INFILTRATION; INTRACAUDAL; PERINEURAL AS NEEDED
Status: DISCONTINUED | OUTPATIENT
Start: 2017-12-29 | End: 2017-12-29 | Stop reason: HOSPADM

## 2017-12-29 RX ORDER — EPINEPHRINE 0.1 MG/ML
1 INJECTION INTRACARDIAC; INTRAVENOUS
Status: DISCONTINUED | OUTPATIENT
Start: 2017-12-29 | End: 2017-12-29 | Stop reason: HOSPADM

## 2017-12-29 RX ORDER — FLUMAZENIL 0.1 MG/ML
0.2 INJECTION INTRAVENOUS
Status: DISCONTINUED | OUTPATIENT
Start: 2017-12-29 | End: 2017-12-29 | Stop reason: HOSPADM

## 2017-12-29 RX ORDER — MAGNESIUM SULFATE HEPTAHYDRATE 40 MG/ML
2 INJECTION, SOLUTION INTRAVENOUS ONCE
Status: COMPLETED | OUTPATIENT
Start: 2017-12-29 | End: 2017-12-29

## 2017-12-29 RX ORDER — MAGNESIUM SULFATE 1 G/100ML
1 INJECTION INTRAVENOUS ONCE
Status: COMPLETED | OUTPATIENT
Start: 2017-12-29 | End: 2017-12-29

## 2017-12-29 RX ORDER — PROPOFOL 10 MG/ML
INJECTION, EMULSION INTRAVENOUS AS NEEDED
Status: DISCONTINUED | OUTPATIENT
Start: 2017-12-29 | End: 2017-12-29 | Stop reason: HOSPADM

## 2017-12-29 RX ORDER — POTASSIUM CHLORIDE 750 MG/1
40 TABLET, FILM COATED, EXTENDED RELEASE ORAL
Status: COMPLETED | OUTPATIENT
Start: 2017-12-29 | End: 2017-12-29

## 2017-12-29 RX ORDER — DEXTROMETHORPHAN/PSEUDOEPHED 2.5-7.5/.8
1.2 DROPS ORAL
Status: DISCONTINUED | OUTPATIENT
Start: 2017-12-29 | End: 2017-12-29 | Stop reason: HOSPADM

## 2017-12-29 RX ORDER — POLYETHYLENE GLYCOL 3350 17 G/17G
17 POWDER, FOR SOLUTION ORAL DAILY
Status: DISCONTINUED | OUTPATIENT
Start: 2017-12-29 | End: 2017-12-29 | Stop reason: HOSPADM

## 2017-12-29 RX ORDER — POLYETHYLENE GLYCOL 3350 17 G/17G
17 POWDER, FOR SOLUTION ORAL DAILY
Qty: 30 PACKET | Refills: 0 | Status: SHIPPED | OUTPATIENT
Start: 2017-12-30 | End: 2018-04-20

## 2017-12-29 RX ORDER — SODIUM CHLORIDE 9 MG/ML
75 INJECTION, SOLUTION INTRAVENOUS CONTINUOUS
Status: DISCONTINUED | OUTPATIENT
Start: 2017-12-29 | End: 2017-12-29 | Stop reason: HOSPADM

## 2017-12-29 RX ORDER — SODIUM CHLORIDE 0.9 % (FLUSH) 0.9 %
5-10 SYRINGE (ML) INJECTION AS NEEDED
Status: ACTIVE | OUTPATIENT
Start: 2017-12-29 | End: 2017-12-29

## 2017-12-29 RX ORDER — NALOXONE HYDROCHLORIDE 0.4 MG/ML
0.4 INJECTION, SOLUTION INTRAMUSCULAR; INTRAVENOUS; SUBCUTANEOUS
Status: DISCONTINUED | OUTPATIENT
Start: 2017-12-29 | End: 2017-12-29 | Stop reason: HOSPADM

## 2017-12-29 RX ORDER — ATROPINE SULFATE 0.1 MG/ML
0.5 INJECTION INTRAVENOUS
Status: DISCONTINUED | OUTPATIENT
Start: 2017-12-29 | End: 2017-12-29 | Stop reason: HOSPADM

## 2017-12-29 RX ORDER — LEVOFLOXACIN 750 MG/1
750 TABLET ORAL EVERY 24 HOURS
Qty: 5 TAB | Refills: 0 | Status: SHIPPED | OUTPATIENT
Start: 2017-12-29 | End: 2018-01-03

## 2017-12-29 RX ADMIN — PANTOPRAZOLE SODIUM 40 MG: 40 TABLET, DELAYED RELEASE ORAL at 15:58

## 2017-12-29 RX ADMIN — METRONIDAZOLE 500 MG: 500 INJECTION, SOLUTION INTRAVENOUS at 02:48

## 2017-12-29 RX ADMIN — PROPOFOL 20 MG: 10 INJECTION, EMULSION INTRAVENOUS at 14:08

## 2017-12-29 RX ADMIN — SODIUM CHLORIDE 75 ML/HR: 900 INJECTION, SOLUTION INTRAVENOUS at 13:43

## 2017-12-29 RX ADMIN — MAGNESIUM SULFATE HEPTAHYDRATE 2 G: 40 INJECTION, SOLUTION INTRAVENOUS at 09:18

## 2017-12-29 RX ADMIN — POTASSIUM CHLORIDE 40 MEQ: 750 TABLET, FILM COATED, EXTENDED RELEASE ORAL at 09:18

## 2017-12-29 RX ADMIN — PROPOFOL 40 MG: 10 INJECTION, EMULSION INTRAVENOUS at 14:05

## 2017-12-29 RX ADMIN — PROPOFOL 20 MG: 10 INJECTION, EMULSION INTRAVENOUS at 14:11

## 2017-12-29 RX ADMIN — ASPIRIN 81 MG 81 MG: 81 TABLET ORAL at 15:58

## 2017-12-29 RX ADMIN — SIMETHICONE 80 MG: 20 SUSPENSION/ DROPS ORAL at 14:11

## 2017-12-29 RX ADMIN — SODIUM CHLORIDE AND POTASSIUM CHLORIDE: 9; 2.98 INJECTION, SOLUTION INTRAVENOUS at 02:48

## 2017-12-29 RX ADMIN — SODIUM CHLORIDE 75 ML/HR: 900 INJECTION, SOLUTION INTRAVENOUS at 09:18

## 2017-12-29 RX ADMIN — LIDOCAINE HYDROCHLORIDE 40 MG: 20 INJECTION, SOLUTION EPIDURAL; INFILTRATION; INTRACAUDAL; PERINEURAL at 14:00

## 2017-12-29 RX ADMIN — PROPOFOL 30 MG: 10 INJECTION, EMULSION INTRAVENOUS at 14:03

## 2017-12-29 RX ADMIN — MAGNESIUM SULFATE HEPTAHYDRATE 1 G: 1 INJECTION, SOLUTION INTRAVENOUS at 10:34

## 2017-12-29 RX ADMIN — LEVOFLOXACIN 750 MG: 5 INJECTION, SOLUTION INTRAVENOUS at 15:53

## 2017-12-29 RX ADMIN — PROPOFOL 70 MG: 10 INJECTION, EMULSION INTRAVENOUS at 14:02

## 2017-12-29 RX ADMIN — PROPOFOL 20 MG: 10 INJECTION, EMULSION INTRAVENOUS at 14:17

## 2017-12-29 RX ADMIN — METRONIDAZOLE 500 MG: 500 INJECTION, SOLUTION INTRAVENOUS at 12:05

## 2017-12-29 RX ADMIN — Medication 10 ML: at 15:53

## 2017-12-29 RX ADMIN — PROPOFOL 20 MG: 10 INJECTION, EMULSION INTRAVENOUS at 14:14

## 2017-12-29 NOTE — PERIOP NOTES
Anesthesia reports 220 mg Propofol, 4 mg Lidocaine and 550 mL NS given during procedure. Received report from anesthesia staff on vital signs and status of patient.

## 2017-12-29 NOTE — PROGRESS NOTES
Per Sophie Nugent from lab; urine results from 12/27 positive for Legionella; Infection control contacted by Ann Shah, Nurse Manager, with results.

## 2017-12-29 NOTE — PROCEDURES
Colonoscopy    Indications: melena  Acute blood loss anemia    Pre-operative Diagnosis: see above    Medications:  See anesthesia form    Post-operative Diagnosis:  colon polyps, anal fissure      Procedure Details   Prior to the procedure its objectives, risks, consequences and alternatives were discussed with the patient who then elected to proceed. All questions were answered. Digital Rectal Exam:  was normal     The Olympus videocolonoscope was inserted in the rectum and advanced to the cecum. The cecum was identified by the appendiceal orifice. This could be an oversewn proximal colon. There was an ileocolonic anastomosis just distal to what appeared to be the appendical orifice. The mucosa was normal.  I intubated 10cm of ileum and it all looked normal.  The colonoscope was slowly and carefully withdrawn as the mucosa was inspected. A 4 mm sessile polyp in the transverse colon was cold snared and recovered with good hemostasis. At 30 cm in the sigmoid colon an 11 mm pedunculated polyp was snared and recovered with good hemostasis. All tissue was recovered. Retroflexion in the rectum was unremarkable. Upon removal of the scope a posterior anal fissure was seen. There was an overlying pigmented protuberance suggesting recent bleeding. Photos to document the anastomosis, appendiceal orifice and retroflexion exam were obtained. The preparation was adeqaute      Estimated Blood Loss:  none    Specimens:  Transverse colon polyp  Sigmoid polyp    Findings:  Two polyps  Anastomosis of small bowel to proximal colon  Anal fissure  No explanation for melena, but the fissure and polyp could have caused anal hemorrhage    Complications:  none    Repeat colonoscopy is recommended in: Three years.                Arnoldo Diaz MD  2:21 PM  12/29/2017

## 2017-12-29 NOTE — H&P
Pre-endoscopy H and P    The patient was seen and examined in the endoscopy suite. The airway was assessed and docuemented. The problem list, past medical history, and medications were reviewed. Patient Active Problem List   Diagnosis Code    HTN (hypertension) I10    Acute CVA (cerebrovascular accident) ([facility] [address] I63.9    Tobacco use Z72.0    Alcohol consumption of one to four drinks per week Z78.9    Diverticulitis large intestine w/o perforation or abscess w/bleeding K57.33    Acute respiratory failure with hypoxia (HCC) J96.01    Melena K92.1    Elevated liver enzymes R74.8     Social History     Social History    Marital status: SINGLE     Spouse name: N/A    Number of children: N/A    Years of education: N/A     Occupational History    Not on file. Social History Main Topics    Smoking status: Current Every Day Smoker     Packs/day: 1.00    Smokeless tobacco: Never Used    Alcohol use Yes      Comment: occasional    Drug use: Yes    Sexual activity: Not on file     Other Topics Concern    Not on file     Social History Narrative     Past Medical History:   Diagnosis Date    [facility]     Diverticulitis large intestine w/o perforation or abscess w/bleeding 2006    with surgery and sepsis    Hypertension     Old myocardial infarct     Tobacco use      The patient has a family history of na    Prior to Admission Medications   Prescriptions Last Dose Informant Patient Reported? Taking?   aspirin 81 mg chewable tablet   No Yes   Sig: Take 1 Tab by mouth daily. atorvastatin (LIPITOR) 80 mg tablet   No Yes   Sig: Take 1 Tab by mouth nightly. lisinopril (PRINIVIL, ZESTRIL) 20 mg tablet   No No   Sig: Take 1 Tab by mouth daily.       Facility-Administered Medications: None           The review of systems is:  Negative today for shortness of breath or chest pain      The heart, lungs, and mental status were satisfactory for the administration of anesthesia sedation and for the procedure. I discussed with the patient the objectives, risks, consequences and alternatives to the procedure.       Cielo Coker MD  12/29/2017  2:00 PM

## 2017-12-29 NOTE — ANESTHESIA POSTPROCEDURE EVALUATION
Post-Anesthesia Evaluation and Assessment    Patient: Jaqueline Garduno MRN: [id number]  SSN: [id number]    YOB: 1950  Age: 69 y.o. Sex: male       Cardiovascular Function/Vital Signs  Visit Vitals    BP 99/72    Pulse 68    Temp 36.4 °C (97.5 °F)    Resp 20    Ht 5' 8\" (1.727 m)    Wt 70.3 kg (155 lb)    SpO2 94%    BMI 23.57 kg/m2       Patient is status post total IV anesthesia, general anesthesia for Procedure(s):  COLONOSCOPY  ENDOSCOPIC POLYPECTOMY. Nausea/Vomiting: None    Postoperative hydration reviewed and adequate. Pain:  Pain Scale 1: Numeric (0 - 10) (12/29/17 1431)  Pain Intensity 1: 0 (12/29/17 1431)   Managed    Neurological Status:   Neuro  Neurologic State: Alert (12/29/17 5761)  Orientation Level: Oriented X4 (12/29/17 1907)  Cognition: Appropriate decision making (12/29/17 1798)  Speech: Clear (12/29/17 0986)   At baseline    Mental Status and Level of Consciousness: Arousable    Pulmonary Status:   O2 Device: Room air (12/29/17 1431)   Adequate oxygenation and airway patent    Complications related to anesthesia: None    Post-anesthesia assessment completed.  No concerns    Signed By: Airam Melendez MD     December 29, 2017

## 2017-12-29 NOTE — ROUTINE PROCESS
TRANSFER - OUT REPORT:    Verbal report given to [address] [place] [address] on Alverta Shark  being transferred to [phone] for routine progression of care       Report consisted of patients Situation, Background, Assessment and   Recommendations(SBAR). Information from the following report(s) Procedure Summary was reviewed with the receiving nurse. Opportunity for questions and clarification was provided.       Patient transported with:

## 2017-12-29 NOTE — PROGRESS NOTES
Patient discharged home; discharge instructions reviewed with patient and opportunity provided for patient to voice questions and concerns; IV and tele box removed; 2 prescriptions sent home with patient.

## 2017-12-29 NOTE — DISCHARGE SUMMARY
Hospitalist Discharge Summary     Patient ID:  Uriah Saucedo  [id number]  69 y.o.  1950    PCP on record: Catrachito Dudley MD    Admit date: 12/26/2017  Discharge date and time: 12/29/2017      DISCHARGE DIAGNOSIS:  Sepsis from left lower Lobe Legionella Pneumonia (Urine Legionella Antigen positive)  Acute respiratory failure with hypoxia, resolved  Elevated LFT's from legionnaires disease, improved (abd ultrasound and CT A/P unremarkable)  ? GI bleed with reported melanotic stools   Upper endoscopy unrevealing   Colonoscopy showed a polyp of the sigmoid colon and transverse colon (both removed) and a Posterior Anal Fissure  Hypokalemia  Essential HTN   Hyperlipidemia   History of left MCA CVA 2014   Tobacco use      CONSULTATIONS:  IP CONSULT TO GASTROENTEROLOGY    Excerpted HPI from H&P of Aspen Lo MD:  Rigo Parker OF PRESENT ILLNESS:  69 Y. O. AAM  [facility]  Past week has been feeling \"sick\"  Severe N/V over 5 times per day for 3-5 days, now improving                        No blood or coffee grounds                        Choked several times with coughing with each episode                        Not vomited at all today  Loose to watery diarrhea, black at times                        Now also improving, no blood                        Denies antibiotics in the past 2 months  Mildly diffusely tender in abdomen  Cough with out sputum x days  Increasing WESTBROOK, moderate to severe, even walking to the bathroom x days  No CP or pleuritic CP   No LE edema  Mild nagging headache x 7 days  Fevers and chills at home     ED:  Hypoxic at rest to 88%  No N/V or diarrhea in ED  WBC 13.6, HR 96, normal lactate  CXR subtle ASD LLL  K 3.0  ,   IV ceftriaxone and azithro,   We were called to admit the patient\"    ______________________________________________________________________  DISCHARGE SUMMARY/HOSPITAL COURSE:  for full details see H&P, daily progress notes, labs, consult notes. Hospital course via problem below:    Sepsis POA with WBC 13,600, HR 98, normal lactate  LLL Lenionella pneumonia with Acute respiratory failure with hypoxia (resolved) and with elevated LFT's (improved, abd ultrasound and CT A/P unremarkable) Urine Legionella +; aspiration pneumonia ruled out  -patient will be given additional Levaquin to complete course as outpatient  -home O2 eval unremarkable     Nausea/vomiting with diarrhea POA; resolved     ? GI bleed with reported melanotic stools POA  Anemia ? Acute blood loss POA HgB 10.4  Last HgB 2017 13.0  -Upper endoscopy unrevealing   -Colonoscopy showed a polyp of the sigmoid colon and transverse colon (both removed) and a Posterior Anal Fissure      Abnormal LFTs POA   -improving      Hypokalemia; repleted      Essential HTN  -lisinopril restarted at discharge, will improve with discontinuation of IVF's      Hyperlipidemia  -resume statin since LFT's improved      History of left MCA CVA 2014 POA  -resume ASA    Tobacco use          _______________________________________________________________________  Patient seen and examined by me on discharge day. Pertinent Findings:  Gen:    Not in distress  Chest: Coarse breath sounds at left base  CVS:   Regular rhythm. No edema  Abd:  Soft, not distended, not tender  Neuro:  Alert  _______________________________________________________________________  DISCHARGE MEDICATIONS:   Current Discharge Medication List      START taking these medications    Details   polyethylene glycol (MIRALAX) 17 gram packet Take 1 Packet by mouth daily. Take this daily to prevent constipation. This is an over the counter product. Qty: 30 Packet, Refills: 0      levoFLOXacin (LEVAQUIN) 750 mg tablet Take 1 Tab by mouth every twenty-four (24) hours for 5 days.  Next dose due on 12/30 at 4:30 pm.  Qty: 5 Tab, Refills: 0         CONTINUE these medications which have NOT CHANGED    Details   aspirin 81 mg chewable tablet Take 1 Tab by mouth daily. Qty: 7 Tab, Refills: 0      atorvastatin (LIPITOR) 80 mg tablet Take 1 Tab by mouth nightly. Qty: 30 Tab, Refills: 12      lisinopril (PRINIVIL, ZESTRIL) 20 mg tablet Take 1 Tab by mouth daily. Qty: 30 Tab, Refills: 12             My Recommended Diet, Activity, Wound Care, and follow-up labs are listed in the patient's Discharge Insturctions which I have personally completed and reviewed.     ______________________________________________________________________    Risk of deterioration: Low    Condition at Discharge:  Stable  ______________________________________________________________________    Disposition  Home with family and home health services  ______________________________________________________________________    Care Plan discussed with:   Patient, RN, Care Manager, Consultant    Comment: Dr. Martita Suggs  ______________________________________________________________________    Code Status: Full Code  ______________________________________________________________________      Follow up with:   PCP : Angella Perea MD  Follow-up Information     Follow up With Details Comments Ozzy Santizo MD On 1/8/2018 10;00am  hospital follow-up [address] [phone] [id number]  [phone]                Total time in minutes spent coordinating this discharge (includes going over instructions, follow-up, prescriptions, and preparing report for sign off to her PCP) :  35 minutes    Signed:  Mehrdad Whitaker MD

## 2017-12-29 NOTE — PROGRESS NOTES
Problem: Mobility Impaired (Adult and Pediatric)  Goal: *Acute Goals and Plan of Care (Insert Text)  Physical Therapy Goals  Initiated 12/27/2017  1. Patient will move from supine to sit and sit to supine , scoot up and down and roll side to side in bed with independence within 7 day(s). 2.  Patient will transfer from bed to chair and chair to bed with independence using the least restrictive device within 7 day(s). 3.  Patient will perform sit to stand with independence within 7 day(s). 4.  Patient will ambulate with independence for 150 feet with the least restrictive device within 7 day(s). 5.  Patient will ascend/descend 4 stairs with 1 handrail(s) with modified independence within 7 day(s). physical Therapy TREATMENT  Patient: Jessica Brown (73 y.o. male)  Date: 12/29/2017  Diagnosis: Acute respiratory failure with hypoxia (HCC)  melena  gi bleed <principal problem not specified>  Procedure(s) (LRB):  COLONOSCOPY (N/A) Day of Surgery  Precautions: Fall  Chart, physical therapy assessment, plan of care and goals were reviewed. ASSESSMENT:  Pt cleared by nurse to mobilize. Pt received in bed supine. Pt agreeable to therapy. BP taken with position changes BP stable throughout. Pt denied any dizziness at start of session with position change. Pt performed supine to sit at SBA. Pt ambulated  200ft at [facility] with cueing to increase CLAIRE. Pt with one LOB with head turn requiring assistance from therapist  to maintain balance. Pt reported feeling dizzy after head turn. Once siting dizziness resolved. Pt with increased ambulation distance  and improve balance requiring less assistance. [facility] vs none if continued progess with acute therapy.      Progression toward goals:  [x]      Improving appropriately and progressing toward goals  []      Improving slowly and progressing toward goals  []      Not making progress toward goals and plan of care will be adjusted     PLAN:  Patient continues to benefit from skilled intervention to address the above impairments. Continue treatment per established plan of care. Discharge Recommendations:  Home Health vs none    Further Equipment Recommendations for Discharge:  none     SUBJECTIVE:   Patient stated I feel pretty good but I'm having another procedure today. [facility] [place]    OBJECTIVE DATA SUMMARY:   Critical Behavior:  Neurologic State: Alert  Orientation Level: Oriented X4  Cognition: Appropriate decision making     Functional Mobility Training:  Bed Mobility:  Rolling: Supervision  Supine to Sit: Supervision     Scooting: Supervision         Transfers:  Sit to Stand: Stand-by asssistance  Stand to Sit: Stand-by asssistance                             Balance:  Sitting: Intact  Standing: Intact  Standing - Static: Good  Standing - Dynamic : Good  Ambulation/Gait Training:  Distance (ft): 200 Feet (ft)  Assistive Device: Gait belt  Ambulation - Level of Assistance: Contact guard assistance        Gait Abnormalities: Decreased step clearance        Base of Support: Narrowed     Speed/Cassi: Pace decreased (<100 feet/min)  Step Length: Right shortened;Left shortened              Pain:  Pain Scale 1: Numeric (0 - 10)  Pain Intensity 1: 0              Activity Tolerance:   Pt with improved ambulation distance and balance.       After treatment:   [] Patient left in no apparent distress sitting up in chair  [x] Patient left in no apparent distress in bed  [x] Call bell left within reach  [x] Nursing notified  [] Caregiver present  [] Bed alarm activated    COMMUNICATION/COLLABORATION:   The patients plan of care was discussed with: Registered Nurse    Harriet Calix   Time Calculation: 13 mins

## 2017-12-29 NOTE — PROGRESS NOTES
D/C plans discussed in IDR and with MD who anticipates d/c to home tomorrow if stable. Orders for [facility] nursing//PT sent to them. No other needs assessed at this time.

## 2017-12-29 NOTE — PROGRESS NOTES
Pt is presently in endoscopy. I submitted [facility] orders to [facility] for nursing and PT. At present, they have not found a MD to sign their orders. The [facility] red clinic has not returned their calls. I've asked her(Sobia at [facility]) to see if Dr Rocco Turner would be willing to sign their orders. We may not know the answer by the end of today. Thai Silva contacted Infection Control who will notify Public Health. [address]  Dr Doretha Jimenez at red [facility] will sign [facility] orders.

## 2017-12-29 NOTE — PERIOP NOTES
Mack Garner  1950  [id number]    Situation:  Verbal report received from: Angie Cole RN  Procedure: Procedure(s):  COLONOSCOPY  ENDOSCOPIC POLYPECTOMY    Background:    Preoperative diagnosis: gi bleed  Postoperative diagnosis: colon polyps, anal fissure    :  Dr. Gamaliel Amin  Assistant(s): Endoscopy Technician-1: Ceci Lemon  Endoscopy RN-1: Guille Glover    Specimens:   ID Type Source Tests Collected by Time Destination   1 : polyp Preservative Colon, Transverse  Griselda Easter, MD 12/29/2017 1414 Pathology   2 : polyp Preservative Sigmoid  Griselda Easter, MD 12/29/2017 1415 Pathology     H. Pylori  no    Assessment:  Intra-procedure medications   Anesthesia gave intra-procedure sedation and medications, see anesthesia flow sheet yes    Intravenous fluids: NS@ KVO     Vital signs stable     Abdominal assessment: round and soft     Recommendation:  Return to floor.   Family or Friend   Permission to share finding with family or friend yes

## 2017-12-29 NOTE — PROGRESS NOTES
See op notes    Rec:  Regular diet  If there is recurrent hemorrhage then capsule endoscopy  miralax to prevent constipation  Sitz baths bid  Dc plans per hospital medicine    Nisreen Seth MD  2:26 PM  12/29/2017

## 2017-12-29 NOTE — PROGRESS NOTES
Bedside shift change report given to [employer] (oncoming nurse) by Pelon Ayala (offgoing nurse). Report included the following information SBAR, Kardex, Intake/Output, MAR and Accordion. Zone Phone for oncoming shift:   [phone]    Shift Summary: NPO after midnight; GoLytly in room    LDAs               Peripheral IV 12/26/17 Right Antecubital (Active)   Site Assessment Clean, dry, & intact 12/28/2017  3:00 PM   Phlebitis Assessment 0 12/28/2017  3:00 PM   Infiltration Assessment 0 12/28/2017  3:00 PM   Dressing Status Clean, dry, & intact 12/28/2017  3:00 PM   Dressing Type Tape;Transparent 12/28/2017  3:00 PM   Hub Color/Line Status Pink; Infusing 12/28/2017  3:00 PM   Action Taken Blood drawn 12/26/2017 11:13 AM                        Intake & Output   Date 12/27/17 1900 - 12/28/17 0659 12/28/17 0700 - 12/29/17 0659   Shift 5833-6161 24 Hour Total 2128-3403 6988-6767 24 Hour Total   I  N  T  A  K  E   I.V. 1200 1200         Volume (0.9% sodium chloride with KCl 40 mEq/L infusion) 1000 1000         Volume (metroNIDAZOLE (FLAGYL) IVPB premix 500 mg) 200 200       Shift Total  (mL/kg) 1200 1200      O  U  T  P  U  T   Urine 400 400         Urine Voided 400 400         Urine Occurrence(s)   1 x  1 x    Stool           Stool Occurrence(s)   2 x  2 x    Shift Total  (mL/kg) 400 400       800      Weight (kg)   70.3 70.3 70.3      Last Bowel Movement Last Bowel Movement Date: 12/25/17   Glucose Checks [] N/A  [] AC/HS  [] Q6  Concerns:   Nutrition Active Orders   Diet    DIET CLEAR LIQUID    DIET NPO       Consults []PT  []OT  []Speech  []Case Management   Cardiac Monitoring []N/A [x]Yes Expires:

## 2017-12-29 NOTE — PROGRESS NOTES
Home Health Care Discharge Planning: [facility]  Face to Face Encounter      NAME: Melony Malone   :  1950   MRN:  [id number]     Primary Diagnosis: Deconditioning, Pneumonia    Date of Face to Face:  2017 4:55 PM                                  Face to Face Encounter findings are related to primary reason for home care:   YES    1. I certify that the patient needs intermittent skilled nursing care, physical therapy and/or speech therapy. I will not be following this patient in the Community and Dr. Nicky Vargas MD will be responsible for signing the [address] of Care. 2. Initial Orders for Care: Physical Therapy and Occupational Therapy    3. I certify that this patient is homebound because of illness or injury, need the aid of supportive devices such as crutches, canes, wheelchairs, and walkers; the use of special transportation; or the assistance of another person in order to leave their place of residence. There exists a normal inability to leave home and leaving home requires a considerable and taxing effort. 4. I certify that this patient is under my care and that I had a Face-to-Face Encounter that meets the physician Face-to-Face Encounter requirements. Document the physical findings from the [address] [place], [place] [address]e Street Encounter that support the need for skilled services: Has new finding of weakness and altered mobility that requires skilled physical/occupational therapy services for evaluation and interventions.      Jacky Busby MD  Discharging Physician  Office: [phone]  Fax:   [phone]

## 2017-12-29 NOTE — PROGRESS NOTES
F/U for melena, acute blood loss anemia  [place] for Poppy Mayers     S: Mr. Tyron Ortiz was seen by me today during rounds. At this time, he is resting + comfortably. The patient has no new complaints today. Please see admission consult for details of ROS; there are no other changes today. He does not describe black or bloody stools with prep. O: Blood pressure 137/76, pulse 74, temperature 98 °F (36.7 °C), resp. rate 16, height 5' 8\" (1.727 m), weight 70.3 kg (155 lb), SpO2 97 %. Gen: Patient is in no acute distress. There is no jaundice. Lungs: Clear to auscultation bilaterally . Heart +: RRR. Abd: Soft, non tender, non-distended, bowel sounds present. Extremities: Warm.    egd negative for bleeding source    Recent Labs      12/29/17 0528 12/28/17 0528   WBC  8.5  10.3   HGB  9.3*  9.6*   HCT  26.8*  27.8*   PLT  544*  421*     Recent Labs      12/29/17 0528 12/28/17   0528  12/27/17   0208   NA  136  135*  135*   K  3.4*  3.2*  3.1*   CL  106  104  100   CO2  21  22  26   BUN  7  14  18   CREA  0.84  0.99  1.26   GLU  99  110*  103*   CA  8.2*  8.2*  8.5   MG  1.0*   --    --      Recent Labs      12/29/17 0528 12/28/17   0528  12/27/17   0208   SGOT  89*  151*  419*   ALT  96*  122*  206*   AP  108  122*  157*   TBILI  0.5  0.7  0.7   TP  6.4  6.3*  6.9   ALB  2.1*  2.0*  2.2*   GLOB  4.3*  4.3*  4.7*   LPSE   --    --   518*     No results for input(s): INR, PTP, APTT in the last 72 hours. No lab exists for component: INREXT   No results for input(s): FE, TIBC, PSAT, FERR in the last 72 hours. Lab Results   Component Value Date/Time    Folate 8.8 06/10/2014 04:45 AM      No results for input(s): PH, PCO2, PO2 in the last 72 hours.   Recent Labs      12/27/17   0208  12/26/17   1639   CPK  246  252   CKNDX  0.7  0.6   TROIQ  <0.04  <0.04     Lab Results   Component Value Date/Time    Cholesterol, total 174 06/09/2014 03:00 AM    HDL Cholesterol 43 06/09/2014 03:00 AM    LDL, calculated 109 06/09/2014 03:00 AM    Triglyceride 110 06/09/2014 03:00 AM    CHOL/HDL Ratio 4.0 06/09/2014 03:00 AM     No results found for: GLUCPOC  Lab Results   Component Value Date/Time    Color DARK YELLOW 12/26/2017 11:55 AM    Appearance CLEAR 12/26/2017 11:55 AM    Specific gravity 1.028 12/26/2017 11:55 AM    pH (UA) 6.0 12/26/2017 11:55 AM    Protein 300 12/26/2017 11:55 AM    Glucose NEGATIVE  12/26/2017 11:55 AM    Ketone TRACE 12/26/2017 11:55 AM    Bilirubin NEGATIVE  04/18/2017 10:40 PM    Urobilinogen 4.0 12/26/2017 11:55 AM    Nitrites NEGATIVE  12/26/2017 11:55 AM    Leukocyte Esterase TRACE 12/26/2017 11:55 AM    Epithelial cells FEW 12/26/2017 11:55 AM    Bacteria NEGATIVE  12/26/2017 11:55 AM    WBC 0-4 12/26/2017 11:55 AM    RBC 5-10 12/26/2017 11:55 AM        Cross sectional imaging none new  A: Active Problems:    Acute respiratory failure with hypoxia ([address] (12/26/2017)      Melena (12/27/2017)      Elevated liver enzymes (12/27/2017)          Comment:   No transfusions received    P. Colonoscopy today   I discussed with him the objectives, risks, consequences and alternatives to the procedure.     He completed the prep    Anuel Cano MD  10:31 AM.  12/29/2017        :

## 2017-12-29 NOTE — DISCHARGE INSTRUCTIONS
HOSPITALIST DISCHARGE INSTRUCTIONS    NAME: Pennie Orellana   :  1950   MRN:  [id number]     Date/Time:  2017 4:29 PM    ADMIT DATE: 2017     DISCHARGE DATE: 2017     DISCHARGE DIAGNOSIS:  Sepsis from left lower Lobe Legionella Pneumonia (Urine Legionella Antigen positive)  Acute respiratory failure with hypoxia, resolved  Elevated LFT's from legionnaires disease, improved (abd ultrasound and CT A/P unremarkable)  ? GI bleed with reported melanotic stools   Upper endoscopy unrevealing   Colonoscopy showed a polyp of the sigmoid colon and transverse colon (both removed) and a Posterior Anal Fissure  Hypokalemia  Essential HTN   Hyperlipidemia   History of left MCA CVA    Tobacco use    MEDICATIONS:  As per medication reconciliation  list  · It is important that you take the medication exactly as they are prescribed. · Keep your medication in the bottles provided by the pharmacist and keep a list of the medication names, dosages, and times to be taken in your wallet. · Do not take other medications without consulting your doctor. Pain Management: Tylenol as needed for pain. Sitz baths for you anal fissure. What to do at Home    Recommended diet:  Regular Diet    Recommended activity: Activity as tolerated. If you experience any of the following symptoms then please call your primary care physician or return to the emergency room if you cannot get hold of your doctor:  Fever, chills, nausea, vomiting, diarrhea, change in mentation, falling, bleeding, shortness of breath, chest pain or any other concerning symptoms. Follow Up:  Dr. Beatrice Anton MD  you are to call and set up an appointment to see them in 7-10 days. Information obtained by :  I understand that if any problems occur once I am at home I am to contact my physician. I understand and acknowledge receipt of the instructions indicated above. Physician's or R.N.'s Signature                                                                  Date/Time                                                                                                                                              Patient or Representative Signature                                                          Date/Time

## 2017-12-31 LAB
BACTERIA SPEC CULT: NORMAL
SERVICE CMNT-IMP: NORMAL

## 2018-01-05 ENCOUNTER — HOSPITAL ENCOUNTER (EMERGENCY)
Age: 68
Discharge: HOME | End: 2018-01-05
Attending: EMERGENCY MEDICINE
Payer: OTHER GOVERNMENT

## 2018-01-05 VITALS
WEIGHT: 154.98 LBS | SYSTOLIC BLOOD PRESSURE: 117 MMHG | HEIGHT: 68 IN | DIASTOLIC BLOOD PRESSURE: 63 MMHG | TEMPERATURE: 97.5 F | BODY MASS INDEX: 23.49 KG/M2 | HEART RATE: 88 BPM | RESPIRATION RATE: 16 BRPM | OXYGEN SATURATION: 97 %

## 2018-01-05 DIAGNOSIS — R10.2 ABDOMINAL PAIN, SUPRAPUBIC: ICD-10-CM

## 2018-01-05 DIAGNOSIS — R33.9 URINARY RETENTION: Primary | ICD-10-CM

## 2018-01-05 LAB
ALBUMIN SERPL-MCNC: 3.3 G/DL (ref 3.5–5)
ALBUMIN/GLOB SERPL: 0.7 {RATIO} (ref 1.1–2.2)
ALP SERPL-CCNC: 115 U/L (ref 45–117)
ALT SERPL-CCNC: 62 U/L (ref 12–78)
ANION GAP SERPL CALC-SCNC: 7 MMOL/L (ref 5–15)
APPEARANCE UR: CLEAR
AST SERPL-CCNC: 40 U/L (ref 15–37)
BACTERIA URNS QL MICRO: NEGATIVE /HPF
BASOPHILS # BLD: 0 K/UL (ref 0–0.1)
BASOPHILS NFR BLD: 0 % (ref 0–1)
BILIRUB SERPL-MCNC: 0.6 MG/DL (ref 0.2–1)
BILIRUB UR QL: NEGATIVE
BUN SERPL-MCNC: 15 MG/DL (ref 6–20)
BUN/CREAT SERPL: 13 (ref 12–20)
CALCIUM SERPL-MCNC: 9.9 MG/DL (ref 8.5–10.1)
CHLORIDE SERPL-SCNC: 104 MMOL/L (ref 97–108)
CO2 SERPL-SCNC: 26 MMOL/L (ref 21–32)
COLOR UR: ABNORMAL
CREAT SERPL-MCNC: 1.14 MG/DL (ref 0.7–1.3)
EOSINOPHIL # BLD: 0.1 K/UL (ref 0–0.4)
EOSINOPHIL NFR BLD: 1 % (ref 0–7)
EPITH CASTS URNS QL MICRO: ABNORMAL /LPF
ERYTHROCYTE [DISTWIDTH] IN BLOOD BY AUTOMATED COUNT: 14.9 % (ref 11.5–14.5)
GLOBULIN SER CALC-MCNC: 4.5 G/DL (ref 2–4)
GLUCOSE SERPL-MCNC: 94 MG/DL (ref 65–100)
GLUCOSE UR STRIP.AUTO-MCNC: NEGATIVE MG/DL
HCT VFR BLD AUTO: 35.5 % (ref 36.6–50.3)
HGB BLD-MCNC: 12.1 G/DL (ref 12.1–17)
HGB UR QL STRIP: ABNORMAL
HYALINE CASTS URNS QL MICRO: ABNORMAL /LPF (ref 0–5)
KETONES UR QL STRIP.AUTO: NEGATIVE MG/DL
LEUKOCYTE ESTERASE UR QL STRIP.AUTO: NEGATIVE
LYMPHOCYTES # BLD: 1.6 K/UL (ref 0.8–3.5)
LYMPHOCYTES NFR BLD: 16 % (ref 12–49)
MCH RBC QN AUTO: 30.4 PG (ref 26–34)
MCHC RBC AUTO-ENTMCNC: 34.1 G/DL (ref 30–36.5)
MCV RBC AUTO: 89.2 FL (ref 80–99)
MONOCYTES # BLD: 0.7 K/UL (ref 0–1)
MONOCYTES NFR BLD: 7 % (ref 5–13)
NEUTS SEG # BLD: 7.6 K/UL (ref 1.8–8)
NEUTS SEG NFR BLD: 76 % (ref 32–75)
NITRITE UR QL STRIP.AUTO: NEGATIVE
PH UR STRIP: 5 [PH] (ref 5–8)
PLATELET # BLD AUTO: 696 K/UL (ref 150–400)
POTASSIUM SERPL-SCNC: 4.2 MMOL/L (ref 3.5–5.1)
PROT SERPL-MCNC: 7.8 G/DL (ref 6.4–8.2)
PROT UR STRIP-MCNC: 30 MG/DL
RBC # BLD AUTO: 3.98 M/UL (ref 4.1–5.7)
RBC #/AREA URNS HPF: ABNORMAL /HPF (ref 0–5)
SODIUM SERPL-SCNC: 137 MMOL/L (ref 136–145)
SP GR UR REFRACTOMETRY: 1.01 (ref 1–1.03)
UROBILINOGEN UR QL STRIP.AUTO: 0.2 EU/DL (ref 0.2–1)
WBC # BLD AUTO: 10 K/UL (ref 4.1–11.1)
WBC URNS QL MICRO: ABNORMAL /HPF (ref 0–4)

## 2018-01-05 PROCEDURE — 85025 COMPLETE CBC W/AUTO DIFF WBC: CPT | Performed by: EMERGENCY MEDICINE

## 2018-01-05 PROCEDURE — 77030005514 HC CATH URETH FOL14 BARD -A

## 2018-01-05 PROCEDURE — 51702 INSERT TEMP BLADDER CATH: CPT

## 2018-01-05 PROCEDURE — 99283 EMERGENCY DEPT VISIT LOW MDM: CPT

## 2018-01-05 PROCEDURE — 81001 URINALYSIS AUTO W/SCOPE: CPT | Performed by: EMERGENCY MEDICINE

## 2018-01-05 PROCEDURE — 80053 COMPREHEN METABOLIC PANEL: CPT | Performed by: EMERGENCY MEDICINE

## 2018-01-05 PROCEDURE — 36415 COLL VENOUS BLD VENIPUNCTURE: CPT | Performed by: EMERGENCY MEDICINE

## 2018-01-05 PROCEDURE — 77030029179 HC BAG URIN DRNG SIMS -A

## 2018-01-05 RX ORDER — CEPHALEXIN 500 MG/1
500 CAPSULE ORAL 3 TIMES DAILY
Qty: 15 CAP | Refills: 0 | Status: SHIPPED | OUTPATIENT
Start: 2018-01-05 | End: 2018-04-20

## 2018-01-05 NOTE — ED NOTES
Gerard bag changed into leg bag. MD discussed dc instructions and information with pt. Pt verbalized understanding. Pt ambulatory with steady gait upon leave.   No signs of distress noted

## 2018-01-05 NOTE — ED PROVIDER NOTES
EMERGENCY DEPARTMENT HISTORY AND PHYSICAL EXAM      Date: 1/5/2018  Patient Name: Katty Santos    History of Presenting Illness     Chief Complaint   Patient presents with    Urinary Retention     patient states that he has been unable to urinate since yesterday       History Provided By: Patient and medical records    HPI: Katty Santos, 69 y.o. male with PMHx significant for HTN and BPH, presents ambulatory to the ED with cc of urinary retention and 10/10 progressively worsening lower abdominal pain that started yesterday. He also c/o associated nausea. He states that he is prescribed Flomax. He reports having normal bowel movements. Per medical records, the patient was admitted on 12/29/2017 for PNA. He states having a family history significant for cardiac disease, HTN, and DM. He denies EtOH use. He specifically denies fevers, chills, vomiting, chest pain, or SOB. There are no other complaints, changes, or physical findings at this time. PCP: Sheila Manuel MD    Current Outpatient Prescriptions   Medication Sig Dispense Refill    cephALEXin (KEFLEX) 500 mg capsule Take 1 Cap by mouth three (3) times daily. 15 Cap 0    polyethylene glycol (MIRALAX) 17 gram packet Take 1 Packet by mouth daily. Take this daily to prevent constipation. This is an over the counter product. 30 Packet 0    aspirin 81 mg chewable tablet Take 1 Tab by mouth daily. 7 Tab 0    atorvastatin (LIPITOR) 80 mg tablet Take 1 Tab by mouth nightly. 30 Tab 12    lisinopril (PRINIVIL, ZESTRIL) 20 mg tablet Take 1 Tab by mouth daily.  27 Tab 12       Past History     Past Medical History:  Past Medical History:   Diagnosis Date    Acute [facility]A [facility]cerebrovascular accident) ([facility] [address]     Diverticulitis large intestine w/o perforation or abscess w/bleeding 2006    with surgery and sepsis    Hypertension     Old myocardial infarct     Tobacco use        Past Surgical History:  Past Surgical History:   Procedure Laterality Date    COLONOSCOPY N/A 12/29/2017    COLONOSCOPY performed by Bernadette Little MD at [address] [place] [address]  12/29/2017         HX GI      colectomy       Family History:  Family History   Problem Relation Age of Onset    Heart Attack Brother     Heart Disease Brother        Social History:  Social History   Substance Use Topics    Smoking status: Current Every Day Smoker     Packs/day: 1.00    Smokeless tobacco: Never Used    Alcohol use Yes      Comment: occasional       Allergies:  No Known Allergies      Review of Systems   Review of Systems   Constitutional: Negative for chills and fever. HENT: Negative for congestion. Eyes: Negative for visual disturbance. Respiratory: Negative for shortness of breath. Cardiovascular: Negative for chest pain. Gastrointestinal: Positive for abdominal pain and nausea. Negative for vomiting. Endocrine: Negative for heat intolerance. Genitourinary: Positive for difficulty urinating (Urinary retention). Musculoskeletal: Negative for back pain. Skin: Negative for rash. Allergic/Immunologic: Negative for immunocompromised state. Neurological: Negative for dizziness. Hematological: Does not bruise/bleed easily. Psychiatric/Behavioral: Negative. All other systems reviewed and are negative. Physical Exam   Physical Exam   Constitutional: He is oriented to person, place, and time. He appears well-developed and well-nourished. He appears distressed (Moderately). HENT:   Head: Normocephalic and atraumatic. Eyes: EOM are normal. Pupils are equal, round, and reactive to light. Neck: Normal range of motion. Neck supple. Cardiovascular: Normal rate, regular rhythm and normal heart sounds. Pulmonary/Chest: Effort normal. He has no wheezes. He has rales in the right lower field. Abdominal: Soft. Bowel sounds are normal. He exhibits distension. There is tenderness in the suprapubic area. Musculoskeletal: Normal range of motion. He exhibits no edema or tenderness. Neurological: He is alert and oriented to person, place, and time. No cranial nerve deficit. Skin: Skin is warm and dry. Psychiatric: He has a normal mood and affect. His behavior is normal.   Nursing note and vitals reviewed. Diagnostic Study Results     Labs -     Recent Results (from the past 12 hour(s))   CBC WITH AUTOMATED DIFF    Collection Time: 01/05/18 11:06 AM   Result Value Ref Range    WBC 10.0 4.1 - 11.1 K/uL    RBC 3.98 (L) 4.10 - 5.70 M/uL    HGB 12.1 12.1 - 17.0 g/dL    HCT 35.5 (L) 36.6 - 50.3 %    MCV 89.2 80.0 - 99.0 FL    MCH 30.4 26.0 - 34.0 PG    MCHC 34.1 30.0 - 36.5 g/dL    RDW 14.9 (H) 11.5 - 14.5 %    PLATELET 731 (H) 080 - 400 K/uL    NEUTROPHILS 76 (H) 32 - 75 %    LYMPHOCYTES 16 12 - 49 %    MONOCYTES 7 5 - 13 %    EOSINOPHILS 1 0 - 7 %    BASOPHILS 0 0 - 1 %    ABS. NEUTROPHILS 7.6 1.8 - 8.0 K/UL    ABS. LYMPHOCYTES 1.6 0.8 - 3.5 K/UL    ABS. MONOCYTES 0.7 0.0 - 1.0 K/UL    ABS. EOSINOPHILS 0.1 0.0 - 0.4 K/UL    ABS. BASOPHILS 0.0 0.0 - 0.1 K/UL   METABOLIC PANEL, COMPREHENSIVE    Collection Time: 01/05/18 11:06 AM   Result Value Ref Range    Sodium 137 136 - 145 mmol/L    Potassium 4.2 3.5 - 5.1 mmol/L    Chloride 104 97 - 108 mmol/L    CO2 26 21 - 32 mmol/L    Anion gap 7 5 - 15 mmol/L    Glucose 94 65 - 100 mg/dL    BUN 15 6 - 20 MG/DL    Creatinine 1.14 0.70 - 1.30 MG/DL    BUN/Creatinine ratio 13 12 - 20      GFR est AA >60 >60 ml/min/1.73m2    GFR est non-AA >60 >60 ml/min/1.73m2    Calcium 9.9 8.5 - 10.1 MG/DL    Bilirubin, total 0.6 0.2 - 1.0 MG/DL    ALT (SGPT) 62 12 - 78 U/L    AST (SGOT) 40 (H) 15 - 37 U/L    Alk.  phosphatase 115 45 - 117 U/L    Protein, total 7.8 6.4 - 8.2 g/dL    Albumin 3.3 (L) 3.5 - 5.0 g/dL    Globulin 4.5 (H) 2.0 - 4.0 g/dL    A-G Ratio 0.7 (L) 1.1 - 2.2     URINALYSIS W/ RFLX MICROSCOPIC    Collection Time: 01/05/18 11:06 AM   Result Value Ref Range    Color YELLOW/STRAW      Appearance CLEAR CLEAR Specific gravity 1.011 1.003 - 1.030      pH (UA) 5.0 5.0 - 8.0      Protein 30 (A) NEG mg/dL    Glucose NEGATIVE  NEG mg/dL    Ketone NEGATIVE  NEG mg/dL    Bilirubin NEGATIVE  NEG      Blood SMALL (A) NEG      Urobilinogen 0.2 0.2 - 1.0 EU/dL    Nitrites NEGATIVE  NEG      Leukocyte Esterase NEGATIVE  NEG      WBC 0-4 0 - 4 /hpf    RBC 0-5 0 - 5 /hpf    Epithelial cells FEW FEW /lpf    Bacteria NEGATIVE  NEG /hpf    Hyaline cast 0-2 0 - 5 /lpf       Medical Decision Making   I am the first provider for this patient. I reviewed the vital signs, available nursing notes, past medical history, past surgical history, family history and social history. Vital Signs-Reviewed the patient's vital signs. Patient Vitals for the past 12 hrs:   Temp Pulse Resp BP SpO2   01/05/18 1131 - - - - 96 %   01/05/18 1130 - - - 112/61 92 %   01/05/18 1020 - - - - 97 %   01/05/18 1019 97.5 °F (36.4 °C) 88 16 129/79 -       Records Reviewed: Nursing Notes, Old Medical Records, Previous Radiology Studies and Previous Laboratory Studies    Provider Notes (Medical Decision Making):   DDx: Urinary retention, UTI, renal insufficiency, BPH. ED Course:   Initial assessment performed. The patients presenting problems have been discussed, and they are in agreement with the care plan formulated and outlined with them. I have encouraged them to ask questions as they arise throughout their visit. TOBACCO COUNSELING:  Upon evaluation, pt expressed that they are a current tobacco user. Pt has been counseled on the dangers of smoking and was encouraged to quit as soon as possible in order to decrease further risks to their health. Pt has conveyed their understanding of the risks involved should they continue to use tobacco products. Progress Note:  11:27 AM  Nursing staff placed a kincaid catheter, and the patient has urinated ~ 850 cc of urine.    Written by Valentino Highland, ED Scribe, as dictated by Keith Hui MD.    Progress Note:  11:59 AM  The patient was re-evaluated, and he states that he is feeling much better. Pt reports that he is ready for discharge. Written by Rachel Doyle, ED Scribe, as dictated by Rogelio Schilder, MD.    Critical Care Time: 0 minutes    Discharge Note:  12:09 PM  The pt is ready for discharge. The pt's signs, symptoms, diagnosis, and discharge instructions have been discussed and pt has conveyed their understanding. The pt is to follow up as recommended or return to ER should their symptoms worsen. Plan has been discussed and pt is in agreement. PLAN:  1. Current Discharge Medication List      START taking these medications    Details   cephALEXin (KEFLEX) 500 mg capsule Take 1 Cap by mouth three (3) times daily. Qty: 15 Cap, Refills: 0           2. Follow-up Information     Follow up With Details Comments [address], [place] In 3 days As needed [facility]  [facility].  [phone]      [facility] EMERGENCY DEPT  If symptoms worsen [address] [place] [address]  [phone]        Return to ED if worse     Diagnosis     Clinical Impression:   1. Urinary retention    2. Abdominal pain, suprapubic        Attestations: This note is prepared by Rachel Doyle, acting as a Scribe for Rogelio Schilder, MD.    Rogelio Schilder, MD: The scribe's documentation has been prepared under my direction and personally reviewed by me in its entirety. I confirm that the notes above accurately reflects all work, treatment, procedures, and medical decision making performed by me.

## 2018-01-05 NOTE — DISCHARGE INSTRUCTIONS

## 2018-04-20 ENCOUNTER — HOSPITAL ENCOUNTER (EMERGENCY)
Age: 68
Discharge: HOME | End: 2018-04-20
Attending: EMERGENCY MEDICINE
Payer: OTHER GOVERNMENT

## 2018-04-20 VITALS
SYSTOLIC BLOOD PRESSURE: 154 MMHG | DIASTOLIC BLOOD PRESSURE: 85 MMHG | RESPIRATION RATE: 16 BRPM | BODY MASS INDEX: 23.72 KG/M2 | TEMPERATURE: 97.3 F | HEIGHT: 68 IN | OXYGEN SATURATION: 97 % | HEART RATE: 72 BPM | WEIGHT: 156.53 LBS

## 2018-04-20 DIAGNOSIS — T83.9XXA FOLEY CATHETER PROBLEM, INITIAL ENCOUNTER (HCC): Primary | ICD-10-CM

## 2018-04-20 DIAGNOSIS — N30.01 ACUTE CYSTITIS WITH HEMATURIA: ICD-10-CM

## 2018-04-20 LAB
APPEARANCE UR: ABNORMAL
BACTERIA URNS QL MICRO: ABNORMAL /HPF
BILIRUB UR QL: NEGATIVE
COLOR UR: ABNORMAL
EPITH CASTS URNS QL MICRO: ABNORMAL /LPF
GLUCOSE UR STRIP.AUTO-MCNC: NEGATIVE MG/DL
HGB UR QL STRIP: ABNORMAL
KETONES UR QL STRIP.AUTO: NEGATIVE MG/DL
LEUKOCYTE ESTERASE UR QL STRIP.AUTO: ABNORMAL
NITRITE UR QL STRIP.AUTO: NEGATIVE
PH UR STRIP: 6 [PH] (ref 5–8)
PROT UR STRIP-MCNC: 30 MG/DL
RBC #/AREA URNS HPF: ABNORMAL /HPF (ref 0–5)
SP GR UR REFRACTOMETRY: 1.02 (ref 1–1.03)
UA: UC IF INDICATED,UAUC: ABNORMAL
UROBILINOGEN UR QL STRIP.AUTO: 0.2 EU/DL (ref 0.2–1)
WBC URNS QL MICRO: ABNORMAL /HPF (ref 0–4)

## 2018-04-20 PROCEDURE — 87186 SC STD MICRODIL/AGAR DIL: CPT | Performed by: PHYSICIAN ASSISTANT

## 2018-04-20 PROCEDURE — 81001 URINALYSIS AUTO W/SCOPE: CPT | Performed by: PHYSICIAN ASSISTANT

## 2018-04-20 PROCEDURE — 87077 CULTURE AEROBIC IDENTIFY: CPT | Performed by: PHYSICIAN ASSISTANT

## 2018-04-20 PROCEDURE — 99283 EMERGENCY DEPT VISIT LOW MDM: CPT

## 2018-04-20 PROCEDURE — 87086 URINE CULTURE/COLONY COUNT: CPT | Performed by: PHYSICIAN ASSISTANT

## 2018-04-20 RX ORDER — CEPHALEXIN 500 MG/1
500 CAPSULE ORAL 3 TIMES DAILY
Qty: 15 CAP | Refills: 0 | Status: SHIPPED | OUTPATIENT
Start: 2018-04-20

## 2018-04-20 NOTE — ED PROVIDER NOTES
EMERGENCY DEPARTMENT HISTORY AND PHYSICAL EXAM      Date: 4/20/2018  Patient Name: Anne Washburn     I have seen and evaluated this patient in the Express Care portion of triage. He is asking to have his kincaid catheter removed. He had the catheter placed 2 weeks ago at an outside facility and was supposed to have it removed in 2 days. He has not seen urology since it was placed. He has a history of BPH and urinary retention requiring kincaid catheters. The patients care will begin now and orders have been placed. This patient will be seen and provided further care in the Emergency Room. Written by Jaquan Maguire, a scribe for MARCEL Amin. History of Presenting Illness     Chief Complaint   Patient presents with    Urinary Catheter Problem     \"I want to get my kincaid taking out\"        History Provided By: Patient    HPI: Anne Washburn, 69 y.o. male with PMHx significant for HTN, CVA, MI, and diverticulitis, presents ambulatory to the ED to have Kincaid catheter removed. Pt reports he had Kincaid placed ~2 weeks ago for urinary retention. He states he has had multiple Kincaid catheters placed in the past, noting he generally has ~3-4 placed per year, but reports they generally are only in place x ~3 days. Pt states he was unable to follow up with Urology following most recent placement due to complications with his insurance. Pt denies currently taking anticoagulants. He specifically denies fever, chills, HA, CP, SOB, nausea, vomiting, or dysuria. Pt reports tobacco and alcohol use. PCP: Millie Tilley MD    There are no other complaints, changes, or physical findings at this time. Current Outpatient Prescriptions   Medication Sig Dispense Refill    AMLODIPINE BESYLATE (AMLODIPINE PO) Take  by mouth.  cephALEXin (KEFLEX) 500 mg capsule Take 1 Cap by mouth three (3) times daily. 15 Cap 0    aspirin 81 mg chewable tablet Take 1 Tab by mouth daily.  7 Tab 0    atorvastatin (LIPITOR) 80 mg tablet Take 1 Tab by mouth nightly. 30 Tab 12    lisinopril (PRINIVIL, ZESTRIL) 20 mg tablet Take 1 Tab by mouth daily. 27 Tab 12       Past History     Past Medical History:  Past Medical History:   Diagnosis Date    Acute CVA (cerebrovascular accident) ([address]     Diverticulitis large intestine w/o perforation or abscess w/bleeding 2006    with surgery and sepsis    Hypertension     Ill-defined condition     enlarged prostate    Old myocardial infarct     Tobacco use        Past Surgical History:  Past Surgical History:   Procedure Laterality Date    COLONOSCOPY N/A 12/29/2017    COLONOSCOPY performed by Rhiannon Holliday MD at [facility] ENDOSCOPY    [facility]  12/29/2017         HX GI      colectomy       Family History:  Family History   Problem Relation Age of Onset    Heart Attack Brother     Heart Disease Brother        Social History:  Social History   Substance Use Topics    Smoking status: Current Every Day Smoker     Packs/day: 1.00    Smokeless tobacco: Never Used    Alcohol use Yes      Comment: occasional       Allergies:  No Known Allergies    Review of Systems   Review of Systems   Constitutional: Negative for chills and fever. HENT: Negative for congestion. Eyes: Negative for visual disturbance. Respiratory: Negative for shortness of breath. Cardiovascular: Negative for chest pain. Gastrointestinal: Negative for nausea and vomiting. Endocrine: Negative for heat intolerance. Genitourinary: Negative for dysuria.        +Gerard problem   Musculoskeletal: Negative for back pain. Skin: Negative for rash. Allergic/Immunologic: Negative for immunocompromised state. Neurological: Negative for headaches. Hematological: Does not bruise/bleed easily. Psychiatric/Behavioral: Negative. All other systems reviewed and are negative. Physical Exam   Physical Exam   Constitutional: He is oriented to person, place, and time.  He appears well-developed and well-nourished. No distress. HENT:   Head: Normocephalic and atraumatic. Eyes: EOM are normal. Pupils are equal, round, and reactive to light. Neck: Normal range of motion. Neck supple. Cardiovascular: Normal rate, regular rhythm and normal heart sounds. Pulmonary/Chest: Effort normal and breath sounds normal. He has no wheezes. Abdominal: Soft. Bowel sounds are normal. There is no tenderness. Musculoskeletal: Normal range of motion. He exhibits no edema or tenderness. Neurological: He is alert and oriented to person, place, and time. No cranial nerve deficit. Skin: Skin is warm and dry. Psychiatric: He has a normal mood and affect. His behavior is normal.   Nursing note and vitals reviewed. Diagnostic Study Results     Labs -     Recent Results (from the past 12 hour(s))   URINALYSIS W/ REFLEX CULTURE    Collection Time: 04/20/18 11:38 AM   Result Value Ref Range    Color YELLOW/STRAW      Appearance CLOUDY (A) CLEAR      Specific gravity 1.017 1.003 - 1.030      pH (UA) 6.0 5.0 - 8.0      Protein 30 (A) NEG mg/dL    Glucose NEGATIVE  NEG mg/dL    Ketone NEGATIVE  NEG mg/dL    Bilirubin NEGATIVE  NEG      Blood MODERATE (A) NEG      Urobilinogen 0.2 0.2 - 1.0 EU/dL    Nitrites NEGATIVE  NEG      Leukocyte Esterase LARGE (A) NEG      WBC  0 - 4 /hpf    RBC 20-50 0 - 5 /hpf    Epithelial cells FEW FEW /lpf    Bacteria 1+ (A) NEG /hpf    UA:UC IF INDICATED URINE CULTURE ORDERED (A) CNI         Medical Decision Making   I am the first provider for this patient. I reviewed the vital signs, available nursing notes, past medical history, past surgical history, family history and social history. Vital Signs-Reviewed the patient's vital signs.   Patient Vitals for the past 12 hrs:   Temp Pulse Resp BP SpO2   04/20/18 0948 97.3 °F (36.3 °C) 72 16 154/85 97 %       Pulse Oximetry Analysis - 97% on RA    Records Reviewed: Nursing Notes and Old Medical Records    Provider Notes (Medical Decision Making):     DDx: Gerard problem, UTI    ED Course:   Initial assessment performed. The patients presenting problems have been discussed, and they are in agreement with the care plan formulated and outlined with them. I have encouraged them to ask questions as they arise throughout their visit. Tobacco Counseling  Discussed the risks of smoking and the benefits of smoking cessation as well as the long term sequelae of smoking with the patient. The patient verbalized their understanding. Disposition:  DISCHARGE NOTE:  12:33 PM  The patient is ready for discharge. The patients signs, symptoms, diagnosis, and instructions for discharge have been discussed and the pt has conveyed their understanding. The patient is to follow up as recommended or return to the ER should their symptoms worsen. Plan has been discussed and patient has conveyed their agreement. PLAN:  1. Current Discharge Medication List      START taking these medications    Details   cephALEXin (KEFLEX) 500 mg capsule Take 1 Cap by mouth three (3) times daily. Qty: 15 Cap, Refills: 0           2. Follow-up Information     Follow up With Details Comments Contact Info    Nila Cates MD In 3 days As needed [address] [id number]  [phone]      [facility] EMERGENCY DEPT  If symptoms worsen [address] [place] [address]  [phone]        Return to ED if worse     Diagnosis     Clinical Impression:   1. Gerard catheter problem, initial encounter ([facility] [address]    2. Acute cystitis with hematuria        Attestations: This note is prepared by Ellen Eli, acting as Scribe for Franck Dugan MD.    Franck Dugan MD: The scribe's documentation has been prepared under my direction and personally reviewed by me in its entirety. I confirm that the note above accurately reflects all work, treatment, procedures, and medical decision making performed by me.

## 2018-04-20 NOTE — DISCHARGE INSTRUCTIONS
Urinary Tract Infections in Men: Care Instructions  Your Care Instructions    A urinary tract infection, or UTI, is a general term for an infection anywhere between the kidneys and the tip of the penis. UTIs can also be a result of a prostate problem. Most cause pain or burning when you urinate. Most UTIs are caused by bacteria and can be cured with antibiotics. It is important to complete your treatment so that the infection does not get worse. Follow-up care is a key part of your treatment and safety. Be sure to make and go to all appointments, and call your doctor if you are having problems. It's also a good idea to know your test results and keep a list of the medicines you take. How can you care for yourself at home? · Take your antibiotics as prescribed. Do not stop taking them just because you feel better. You need to take the full course of antibiotics. · Take your medicines exactly as prescribed. Your doctor may have prescribed a medicine, such as phenazopyridine (Pyridium), to help relieve pain when you urinate. This turns your urine orange. You may stop taking it when your symptoms get better. But be sure to take all of your antibiotics, which treat the infection. · Drink extra water for the next day or two. This will help make the urine less concentrated and help wash out the bacteria causing the infection. (If you have kidney, heart, or liver disease and have to limit your fluids, talk with your doctor before you increase your fluid intake.)  · Avoid drinks that are carbonated or have caffeine. They can irritate the bladder. · Urinate often. Try to empty your bladder each time. · To relieve pain, take a hot bath or lay a heating pad (set on low) over your lower belly or genital area. Never go to sleep with a heating pad in place. To help prevent UTIs  · Drink plenty of fluids, enough so that your urine is light yellow or clear like water.  If you have kidney, heart, or liver disease and have to limit fluids, talk with your doctor before you increase the amount of fluids you drink. · Urinate when you have the urge. Do not hold your urine for a long time. Urinate before you go to sleep. · Keep your penis clean. Catheter care  If you have a drainage tube (catheter) in place, the following steps will help you care for it. · Always wash your hands before and after touching your catheter. · Check the area around the urethra for inflammation or signs of infection. Signs of infection include irritated, swollen, red, or tender skin, or pus around the catheter. · Clean the area around the catheter with soap and water two times a day. Dry with a clean towel afterward. · Do not apply powder or lotion to the skin around the catheter. To empty the urine collection bag  · Wash your hands with soap and water. · Without touching the drain spout, remove the spout from its sleeve at the bottom of the collection bag. Open the valve on the spout. · Let the urine flow out of the bag and into the toilet or a container. Do not let the tubing or drain spout touch anything. · After you empty the bag, clean the end of the drain spout with tissue and water. Close the valve and put the drain spout back into its sleeve at the bottom of the collection bag. · Wash your hands with soap and water. When should you call for help? Call your doctor now or seek immediate medical care if:  ? · Symptoms such as a fever, chills, nausea, or vomiting get worse or happen for the first time. ? · You have new pain in your back just below your rib cage. This is called flank pain. ? · There is new blood or pus in your urine. ? · You are not able to take or keep down your antibiotics. ? Watch closely for changes in your health, and be sure to contact your doctor if:  ? · You are not getting better after taking an antibiotic for 2 days. ? · Your symptoms go away but then come back. Where can you learn more?   Go to http://tania-lorna.info/. Enter X701 in the search box to learn more about \"Urinary Tract Infections in Men: Care Instructions. \"  Current as of: May 12, 2017  Content Version: 11.4  © 0308-0590 [employer]. Care instructions adapted under license by [employer] (which disclaims liability or warranty for this information). If you have questions about a medical condition or this instruction, always ask your healthcare professional. [address] any warranty or liability for your use of this information. Thank you! Thank you for allowing us to provide you with excellent care today. We hope we addressed all of your concerns and needs. We strive to provide excellent quality care in the Emergency Department. You may receive a survey after your visit to evaluate the care you were provided. Should you receive a survey from us, we invite you to share your experience and tell us what made it excellent. It was a pleasure serving you, we invite you to share your experience with us, in our pursuit for excellence, should you be selected to receive a survey. If you feel that you have not received excellent quality care or timely care, please ask to speak to the nurse manager. Please choose us in the future for your continued health care needs. ------------------------------------------------------------------------------------------------------------  The exam and treatment you received in the Emergency Department were for an urgent problem and are not intended as complete care. It is important that you follow up with a doctor, nurse practitioner, or physician assistant for ongoing care. If your symptoms become worse or you do not improve as expected and you are unable to reach your usual health care provider, you should return to the Emergency Department. We are available 24 hours a day.     Please take your discharge instructions with you when you go to your follow-up appointment. If you have any problem arranging a follow-up appointment, contact the Emergency Department immediately. If a prescription has been provided, please have it filled as soon as possible to prevent a delay in treatment. Read the entire medication instruction sheet provided to you by the pharmacy. If you have any questions or reservations about taking the medication due to side effects or interactions with other medications, please call your primary care physician or contact the ER to speak with the charge nurse. Make an appointment with your family doctor or the physician you were referred to for follow-up of this visit as instructed on your discharge paperwork, as this is mandatory follow-up. Return to the ER if you are unable to be seen or if you are unable to be seen in a timely manner. If you have any problem arranging the follow-up visit, contact the Emergency Department immediately.

## 2018-04-20 NOTE — ED NOTES
Pt. States he wants his kincaid out and [facility] placed his kincaid 2 weeks ago. Pt. Mallie Kanner to [facility] and hasn't seen since kincaid placed.

## 2018-04-20 NOTE — ED NOTES
Discharge instructions reviewed w/ pt and copy given by Dr. Goldie Paul. Pt discharged ambulatory from the ED to care of self.

## 2018-04-22 LAB
BACTERIA SPEC CULT: ABNORMAL
BACTERIA SPEC CULT: ABNORMAL
CC UR VC: ABNORMAL
SERVICE CMNT-IMP: ABNORMAL

## 2018-10-10 NOTE — PROGRESS NOTES
TRANSFER - IN REPORT:    Verbal report received from Candice(name) on Terell Sorenson  being received from [facility](unit) for ordered procedure      Report consisted of patients Situation, Background, Assessment and   Recommendations(SBAR). Information from the following report(s) SBAR, MAR and Recent Results was reviewed with the receiving nurse. Opportunity for questions and clarification was provided. Assessment completed upon patients arrival to unit and care assumed. Neuro team

## (undated) DEVICE — CONTAINER SPEC 20 ML LID NEUT BUFF FORMALIN 10 % POLYPR STS

## (undated) DEVICE — BLOCK BITE ENDOSCP AD 21 MM W/ DIL BLU LF DISP

## (undated) DEVICE — NON-REM POLYHESIVE PATIENT RETURN ELECTRODE: Brand: VALLEYLAB

## (undated) DEVICE — BASIN EMSIS 16OZ GRAPHITE PLAS KID SHP MOLD GRAD FOR ORAL

## (undated) DEVICE — KENDALL RADIOLUCENT FOAM MONITORING ELECTRODE RECTANGULAR SHAPE: Brand: KENDALL

## (undated) DEVICE — STRAINER URIN CALC RNL MSH -- CONVERT TO ITEM 357634

## (undated) DEVICE — MEDI-VAC YANK SUCT HNDL W/TPRD BULBOUS TIP: Brand: CARDINAL HEALTH

## (undated) DEVICE — SET ADMIN 16ML TBNG L100IN 2 Y INJ SITE IV PIGGY BK DISP

## (undated) DEVICE — TRAP SUC MUCOUS 70ML -- MEDICHOICE MEDLINE

## (undated) DEVICE — TOWEL 4 PLY TISS 19X30 SUE WHT

## (undated) DEVICE — NEEDLE HYPO 18GA L1.5IN PNK S STL HUB POLYPR SHLD REG BVL

## (undated) DEVICE — Z DISCONTINUED PER MEDLINE LINE GAS SAMPLING O2/CO2 LNG AD 13 FT NSL W/ TBNG FILTERLINE

## (undated) DEVICE — SYR 3ML LL TIP 1/10ML GRAD --

## (undated) DEVICE — SYR 10ML LUER LOK 1/5ML GRAD --

## (undated) DEVICE — Device

## (undated) DEVICE — Device: Brand: MEDEX

## (undated) DEVICE — 1200 GUARD II KIT W/5MM TUBE W/O VAC TUBE: Brand: GUARDIAN

## (undated) DEVICE — CATH IV AUTOGRD BC PNK 20GA 25 -- INSYTE

## (undated) DEVICE — SOLIDIFIER MEDC 1200ML -- CONVERT TO 356117

## (undated) DEVICE — SNARE ENDOSCP M L240CM W27MM SHTH DIA2.4MM CHN 2.8MM OVL

## (undated) DEVICE — NEONATAL-ADULT SPO2 SENSOR: Brand: NELLCOR